# Patient Record
Sex: FEMALE | Race: WHITE | NOT HISPANIC OR LATINO | Employment: OTHER | URBAN - METROPOLITAN AREA
[De-identification: names, ages, dates, MRNs, and addresses within clinical notes are randomized per-mention and may not be internally consistent; named-entity substitution may affect disease eponyms.]

---

## 2017-03-22 ENCOUNTER — RECORDS - HEALTHEAST (OUTPATIENT)
Dept: LAB | Facility: CLINIC | Age: 67
End: 2017-03-22

## 2017-03-22 LAB
CHOLEST SERPL-MCNC: 168 MG/DL
FASTING STATUS PATIENT QL REPORTED: NORMAL
HDLC SERPL-MCNC: 58 MG/DL
LDLC SERPL CALC-MCNC: 90 MG/DL
TRIGL SERPL-MCNC: 101 MG/DL

## 2018-02-22 ENCOUNTER — RECORDS - HEALTHEAST (OUTPATIENT)
Dept: LAB | Facility: CLINIC | Age: 68
End: 2018-02-22

## 2018-02-22 LAB
ALBUMIN SERPL-MCNC: 3.7 G/DL (ref 3.5–5)
ALP SERPL-CCNC: 60 U/L (ref 45–120)
ALT SERPL W P-5'-P-CCNC: 13 U/L (ref 0–45)
ANION GAP SERPL CALCULATED.3IONS-SCNC: 7 MMOL/L (ref 5–18)
AST SERPL W P-5'-P-CCNC: 19 U/L (ref 0–40)
BILIRUB SERPL-MCNC: 0.4 MG/DL (ref 0–1)
BUN SERPL-MCNC: 12 MG/DL (ref 8–22)
CALCIUM SERPL-MCNC: 9.2 MG/DL (ref 8.5–10.5)
CHLORIDE BLD-SCNC: 104 MMOL/L (ref 98–107)
CHOLEST SERPL-MCNC: 176 MG/DL
CO2 SERPL-SCNC: 26 MMOL/L (ref 22–31)
CREAT SERPL-MCNC: 0.86 MG/DL (ref 0.6–1.1)
FASTING STATUS PATIENT QL REPORTED: NO
GFR SERPL CREATININE-BSD FRML MDRD: >60 ML/MIN/1.73M2
GLUCOSE BLD-MCNC: 152 MG/DL (ref 70–125)
HDLC SERPL-MCNC: 66 MG/DL
LDLC SERPL CALC-MCNC: 90 MG/DL
POTASSIUM BLD-SCNC: 4.2 MMOL/L (ref 3.5–5)
PROT SERPL-MCNC: 6.6 G/DL (ref 6–8)
SODIUM SERPL-SCNC: 137 MMOL/L (ref 136–145)
TRIGL SERPL-MCNC: 98 MG/DL

## 2018-05-03 ENCOUNTER — RECORDS - HEALTHEAST (OUTPATIENT)
Dept: LAB | Facility: CLINIC | Age: 68
End: 2018-05-03

## 2018-05-03 LAB
ERYTHROCYTE [SEDIMENTATION RATE] IN BLOOD BY WESTERGREN METHOD: 10 MM/HR (ref 0–20)
RHEUMATOID FACT SERPL-ACNC: <15 IU/ML (ref 0–30)
URATE SERPL-MCNC: 5 MG/DL (ref 2–7.5)

## 2018-05-04 LAB — LYME TOTAL ANTIBODY - HISTORICAL: 0.05 INDEX VALUE

## 2018-06-21 ENCOUNTER — RECORDS - HEALTHEAST (OUTPATIENT)
Dept: LAB | Facility: CLINIC | Age: 68
End: 2018-06-21

## 2018-06-21 LAB
ALBUMIN SERPL-MCNC: 3.7 G/DL (ref 3.5–5)
ALP SERPL-CCNC: 54 U/L (ref 45–120)
ALT SERPL W P-5'-P-CCNC: <9 U/L (ref 0–45)
ANION GAP SERPL CALCULATED.3IONS-SCNC: 10 MMOL/L (ref 5–18)
AST SERPL W P-5'-P-CCNC: 16 U/L (ref 0–40)
BILIRUB SERPL-MCNC: 0.4 MG/DL (ref 0–1)
BUN SERPL-MCNC: 12 MG/DL (ref 8–22)
CALCIUM SERPL-MCNC: 9.4 MG/DL (ref 8.5–10.5)
CHLORIDE BLD-SCNC: 104 MMOL/L (ref 98–107)
CO2 SERPL-SCNC: 23 MMOL/L (ref 22–31)
CREAT SERPL-MCNC: 0.85 MG/DL (ref 0.6–1.1)
GFR SERPL CREATININE-BSD FRML MDRD: >60 ML/MIN/1.73M2
GLUCOSE BLD-MCNC: 162 MG/DL (ref 70–125)
POTASSIUM BLD-SCNC: 4.5 MMOL/L (ref 3.5–5)
PROT SERPL-MCNC: 6.6 G/DL (ref 6–8)
SODIUM SERPL-SCNC: 137 MMOL/L (ref 136–145)

## 2018-10-25 ENCOUNTER — RECORDS - HEALTHEAST (OUTPATIENT)
Dept: LAB | Facility: CLINIC | Age: 68
End: 2018-10-25

## 2018-10-25 LAB
ALBUMIN SERPL-MCNC: 3.6 G/DL (ref 3.5–5)
ALP SERPL-CCNC: 57 U/L (ref 45–120)
ALT SERPL W P-5'-P-CCNC: 10 U/L (ref 0–45)
ANION GAP SERPL CALCULATED.3IONS-SCNC: 10 MMOL/L (ref 5–18)
AST SERPL W P-5'-P-CCNC: 17 U/L (ref 0–40)
BILIRUB SERPL-MCNC: 0.4 MG/DL (ref 0–1)
BUN SERPL-MCNC: 15 MG/DL (ref 8–28)
CALCIUM SERPL-MCNC: 9.6 MG/DL (ref 8.5–10.5)
CHLORIDE BLD-SCNC: 103 MMOL/L (ref 98–107)
CHOLEST SERPL-MCNC: 170 MG/DL
CO2 SERPL-SCNC: 24 MMOL/L (ref 22–31)
CREAT SERPL-MCNC: 0.8 MG/DL (ref 0.6–1.1)
FASTING STATUS PATIENT QL REPORTED: NORMAL
GFR SERPL CREATININE-BSD FRML MDRD: >60 ML/MIN/1.73M2
GLUCOSE BLD-MCNC: 100 MG/DL (ref 70–125)
HDLC SERPL-MCNC: 57 MG/DL
LDLC SERPL CALC-MCNC: 89 MG/DL
POTASSIUM BLD-SCNC: 4.6 MMOL/L (ref 3.5–5)
PROT SERPL-MCNC: 6.5 G/DL (ref 6–8)
SODIUM SERPL-SCNC: 137 MMOL/L (ref 136–145)
TRIGL SERPL-MCNC: 122 MG/DL

## 2018-10-26 LAB — HBV SURFACE AG SERPL QL IA: NEGATIVE

## 2018-11-08 ENCOUNTER — RECORDS - HEALTHEAST (OUTPATIENT)
Dept: LAB | Facility: CLINIC | Age: 68
End: 2018-11-08

## 2018-11-08 LAB
C REACTIVE PROTEIN LHE: 1.1 MG/DL (ref 0–0.8)
ERYTHROCYTE [SEDIMENTATION RATE] IN BLOOD BY WESTERGREN METHOD: 12 MM/HR (ref 0–20)

## 2018-12-18 ENCOUNTER — RECORDS - HEALTHEAST (OUTPATIENT)
Dept: LAB | Facility: CLINIC | Age: 68
End: 2018-12-18

## 2018-12-18 LAB
ALBUMIN SERPL-MCNC: 3.3 G/DL (ref 3.5–5)
ALP SERPL-CCNC: 52 U/L (ref 45–120)
ALT SERPL W P-5'-P-CCNC: 11 U/L (ref 0–45)
ANION GAP SERPL CALCULATED.3IONS-SCNC: 9 MMOL/L (ref 5–18)
AST SERPL W P-5'-P-CCNC: 15 U/L (ref 0–40)
BILIRUB SERPL-MCNC: 0.4 MG/DL (ref 0–1)
BUN SERPL-MCNC: 13 MG/DL (ref 8–28)
CALCIUM SERPL-MCNC: 9 MG/DL (ref 8.5–10.5)
CHLORIDE BLD-SCNC: 104 MMOL/L (ref 98–107)
CO2 SERPL-SCNC: 25 MMOL/L (ref 22–31)
CREAT SERPL-MCNC: 0.9 MG/DL (ref 0.6–1.1)
GFR SERPL CREATININE-BSD FRML MDRD: >60 ML/MIN/1.73M2
GLUCOSE BLD-MCNC: 267 MG/DL (ref 70–125)
POTASSIUM BLD-SCNC: 4.4 MMOL/L (ref 3.5–5)
PROT SERPL-MCNC: 5.8 G/DL (ref 6–8)
SODIUM SERPL-SCNC: 138 MMOL/L (ref 136–145)

## 2019-01-18 ENCOUNTER — RECORDS - HEALTHEAST (OUTPATIENT)
Dept: LAB | Facility: CLINIC | Age: 69
End: 2019-01-18

## 2019-01-18 LAB
ALBUMIN SERPL-MCNC: 3.4 G/DL (ref 3.5–5)
ALP SERPL-CCNC: 48 U/L (ref 45–120)
ALT SERPL W P-5'-P-CCNC: 10 U/L (ref 0–45)
AST SERPL W P-5'-P-CCNC: 14 U/L (ref 0–40)
BILIRUB DIRECT SERPL-MCNC: 0.1 MG/DL
BILIRUB SERPL-MCNC: 0.4 MG/DL (ref 0–1)
PROT SERPL-MCNC: 6.4 G/DL (ref 6–8)

## 2019-02-15 ENCOUNTER — RECORDS - HEALTHEAST (OUTPATIENT)
Dept: LAB | Facility: CLINIC | Age: 69
End: 2019-02-15

## 2019-02-15 LAB
ALBUMIN SERPL-MCNC: 3.6 G/DL (ref 3.5–5)
ALP SERPL-CCNC: 55 U/L (ref 45–120)
ALT SERPL W P-5'-P-CCNC: 13 U/L (ref 0–45)
AST SERPL W P-5'-P-CCNC: 17 U/L (ref 0–40)
BILIRUB DIRECT SERPL-MCNC: 0.1 MG/DL
BILIRUB SERPL-MCNC: 0.3 MG/DL (ref 0–1)
PROT SERPL-MCNC: 6.5 G/DL (ref 6–8)

## 2019-03-15 ENCOUNTER — RECORDS - HEALTHEAST (OUTPATIENT)
Dept: LAB | Facility: CLINIC | Age: 69
End: 2019-03-15

## 2019-03-15 LAB
ALBUMIN SERPL-MCNC: 3.4 G/DL (ref 3.5–5)
ALP SERPL-CCNC: 50 U/L (ref 45–120)
ALT SERPL W P-5'-P-CCNC: 15 U/L (ref 0–45)
AST SERPL W P-5'-P-CCNC: 17 U/L (ref 0–40)
BILIRUB DIRECT SERPL-MCNC: 0.2 MG/DL
BILIRUB SERPL-MCNC: 0.3 MG/DL (ref 0–1)
PROT SERPL-MCNC: 6.2 G/DL (ref 6–8)

## 2019-04-05 ENCOUNTER — RECORDS - HEALTHEAST (OUTPATIENT)
Dept: ADMINISTRATIVE | Facility: OTHER | Age: 69
End: 2019-04-05

## 2019-04-12 ENCOUNTER — RECORDS - HEALTHEAST (OUTPATIENT)
Dept: LAB | Facility: CLINIC | Age: 69
End: 2019-04-12

## 2019-04-12 LAB
ALBUMIN SERPL-MCNC: 3.6 G/DL (ref 3.5–5)
ALP SERPL-CCNC: 54 U/L (ref 45–120)
ALT SERPL W P-5'-P-CCNC: <9 U/L (ref 0–45)
AST SERPL W P-5'-P-CCNC: 12 U/L (ref 0–40)
BILIRUB DIRECT SERPL-MCNC: 0.1 MG/DL
BILIRUB SERPL-MCNC: 0.3 MG/DL (ref 0–1)
PROT SERPL-MCNC: 6.5 G/DL (ref 6–8)

## 2019-04-30 ENCOUNTER — OFFICE VISIT - HEALTHEAST (OUTPATIENT)
Dept: RHEUMATOLOGY | Facility: CLINIC | Age: 69
End: 2019-04-30

## 2019-04-30 ENCOUNTER — COMMUNICATION - HEALTHEAST (OUTPATIENT)
Dept: LAB | Facility: CLINIC | Age: 69
End: 2019-04-30

## 2019-04-30 DIAGNOSIS — M06.4 INFLAMMATORY POLYARTHRITIS (H): ICD-10-CM

## 2019-04-30 DIAGNOSIS — M19.042 LOCALIZED PRIMARY OSTEOARTHRITIS OF BOTH HANDS: ICD-10-CM

## 2019-04-30 DIAGNOSIS — M25.50 POLYARTHRALGIA: ICD-10-CM

## 2019-04-30 DIAGNOSIS — M19.041 LOCALIZED PRIMARY OSTEOARTHRITIS OF BOTH HANDS: ICD-10-CM

## 2019-04-30 ASSESSMENT — MIFFLIN-ST. JEOR: SCORE: 1090.58

## 2019-05-24 ENCOUNTER — RECORDS - HEALTHEAST (OUTPATIENT)
Dept: LAB | Facility: CLINIC | Age: 69
End: 2019-05-24

## 2019-05-24 LAB
ALBUMIN SERPL-MCNC: 3.7 G/DL (ref 3.5–5)
ALP SERPL-CCNC: 50 U/L (ref 45–120)
ALT SERPL W P-5'-P-CCNC: <9 U/L (ref 0–45)
AST SERPL W P-5'-P-CCNC: 15 U/L (ref 0–40)
BILIRUB DIRECT SERPL-MCNC: 0.2 MG/DL
BILIRUB SERPL-MCNC: 0.4 MG/DL (ref 0–1)
CHOLEST SERPL-MCNC: 160 MG/DL
FASTING STATUS PATIENT QL REPORTED: NORMAL
HDLC SERPL-MCNC: 61 MG/DL
LDLC SERPL CALC-MCNC: 78 MG/DL
PROT SERPL-MCNC: 6.6 G/DL (ref 6–8)
TRIGL SERPL-MCNC: 103 MG/DL

## 2019-06-11 ENCOUNTER — AMBULATORY - HEALTHEAST (OUTPATIENT)
Dept: LAB | Facility: CLINIC | Age: 69
End: 2019-06-11

## 2019-06-11 DIAGNOSIS — M06.4 INFLAMMATORY POLYARTHRITIS (H): ICD-10-CM

## 2019-06-11 DIAGNOSIS — M25.50 POLYARTHRALGIA: ICD-10-CM

## 2019-06-11 LAB
ALBUMIN SERPL-MCNC: 3.6 G/DL (ref 3.5–5)
ALT SERPL W P-5'-P-CCNC: 9 U/L (ref 0–45)
BASOPHILS # BLD AUTO: 0 THOU/UL (ref 0–0.2)
BASOPHILS NFR BLD AUTO: 0 % (ref 0–2)
CCP AB SER IA-ACNC: 1.8 U/ML
CREAT SERPL-MCNC: 0.83 MG/DL (ref 0.6–1.1)
EOSINOPHIL # BLD AUTO: 0.2 THOU/UL (ref 0–0.4)
EOSINOPHIL NFR BLD AUTO: 4 % (ref 0–6)
ERYTHROCYTE [DISTWIDTH] IN BLOOD BY AUTOMATED COUNT: 12 % (ref 11–14.5)
GFR SERPL CREATININE-BSD FRML MDRD: >60 ML/MIN/1.73M2
HCT VFR BLD AUTO: 37.9 % (ref 35–47)
HGB BLD-MCNC: 12.7 G/DL (ref 12–16)
LYMPHOCYTES # BLD AUTO: 0.9 THOU/UL (ref 0.8–4.4)
LYMPHOCYTES NFR BLD AUTO: 16 % (ref 20–40)
MCH RBC QN AUTO: 32.2 PG (ref 27–34)
MCHC RBC AUTO-ENTMCNC: 33.5 G/DL (ref 32–36)
MCV RBC AUTO: 96 FL (ref 80–100)
MONOCYTES # BLD AUTO: 0.5 THOU/UL (ref 0–0.9)
MONOCYTES NFR BLD AUTO: 8 % (ref 2–10)
NEUTROPHILS # BLD AUTO: 4.1 THOU/UL (ref 2–7.7)
NEUTROPHILS NFR BLD AUTO: 71 % (ref 50–70)
PLATELET # BLD AUTO: 296 THOU/UL (ref 140–440)
PMV BLD AUTO: 7.9 FL (ref 7–10)
RBC # BLD AUTO: 3.95 MILL/UL (ref 3.8–5.4)
RHEUMATOID FACT SERPL-ACNC: <15 IU/ML (ref 0–30)
URATE SERPL-MCNC: 5 MG/DL (ref 2–7.5)
WBC: 5.7 THOU/UL (ref 4–11)

## 2019-06-12 LAB — HCV AB SERPL QL IA: NEGATIVE

## 2019-06-13 LAB — ANA SER QL: 4.4 U

## 2019-06-18 LAB
DNA (DS) ANTIBODY - HISTORICAL: 2 IU
JO-1 AUTOANTIBODIES - HISTORICAL: 0 EU
SCL-70 AUTOANTIBODIES - HISTORICAL: 0 EU
SM (SMITH AUTOANTIBODIES - HISTORICAL: 1 EU
SM/RNP AUTOANTIBODIES - HISTORICAL: 2 EU
SS-A/RO AUTOANTIBODIES - HISTORICAL: 1 EU
SS-B/LA AUTOANTIBODIES - HISTORICAL: 0 EU

## 2019-07-19 ENCOUNTER — COMMUNICATION - HEALTHEAST (OUTPATIENT)
Dept: LAB | Facility: CLINIC | Age: 69
End: 2019-07-19

## 2019-07-19 DIAGNOSIS — M06.4 INFLAMMATORY POLYARTHRITIS (H): ICD-10-CM

## 2019-07-29 ENCOUNTER — AMBULATORY - HEALTHEAST (OUTPATIENT)
Dept: LAB | Facility: CLINIC | Age: 69
End: 2019-07-29

## 2019-07-29 DIAGNOSIS — M06.4 INFLAMMATORY POLYARTHRITIS (H): ICD-10-CM

## 2019-07-29 LAB
ALBUMIN SERPL-MCNC: 3.5 G/DL (ref 3.5–5)
ALT SERPL W P-5'-P-CCNC: 10 U/L (ref 0–45)
CREAT SERPL-MCNC: 0.79 MG/DL (ref 0.6–1.1)
ERYTHROCYTE [DISTWIDTH] IN BLOOD BY AUTOMATED COUNT: 11.5 % (ref 11–14.5)
GFR SERPL CREATININE-BSD FRML MDRD: >60 ML/MIN/1.73M2
HCT VFR BLD AUTO: 34.5 % (ref 35–47)
HGB BLD-MCNC: 11.6 G/DL (ref 12–16)
MCH RBC QN AUTO: 32.1 PG (ref 27–34)
MCHC RBC AUTO-ENTMCNC: 33.7 G/DL (ref 32–36)
MCV RBC AUTO: 95 FL (ref 80–100)
PLATELET # BLD AUTO: 321 THOU/UL (ref 140–440)
PMV BLD AUTO: 7.8 FL (ref 7–10)
RBC # BLD AUTO: 3.62 MILL/UL (ref 3.8–5.4)
WBC: 7 THOU/UL (ref 4–11)

## 2019-08-01 ENCOUNTER — OFFICE VISIT - HEALTHEAST (OUTPATIENT)
Dept: RHEUMATOLOGY | Facility: CLINIC | Age: 69
End: 2019-08-01

## 2019-08-01 DIAGNOSIS — M19.042 LOCALIZED PRIMARY OSTEOARTHRITIS OF BOTH HANDS: ICD-10-CM

## 2019-08-01 DIAGNOSIS — M19.041 LOCALIZED PRIMARY OSTEOARTHRITIS OF BOTH HANDS: ICD-10-CM

## 2019-08-01 DIAGNOSIS — L40.50 PSA (PSORIATIC ARTHRITIS) (H): ICD-10-CM

## 2019-08-01 DIAGNOSIS — L40.9 PSORIASIS: ICD-10-CM

## 2019-11-04 ENCOUNTER — AMBULATORY - HEALTHEAST (OUTPATIENT)
Dept: LAB | Facility: CLINIC | Age: 69
End: 2019-11-04

## 2019-11-04 DIAGNOSIS — M06.4 INFLAMMATORY POLYARTHRITIS (H): ICD-10-CM

## 2019-11-04 LAB
ALBUMIN SERPL-MCNC: 3.7 G/DL (ref 3.5–5)
ALT SERPL W P-5'-P-CCNC: 21 U/L (ref 0–45)
CREAT SERPL-MCNC: 0.81 MG/DL (ref 0.6–1.1)
ERYTHROCYTE [DISTWIDTH] IN BLOOD BY AUTOMATED COUNT: 12.6 % (ref 11–14.5)
GFR SERPL CREATININE-BSD FRML MDRD: >60 ML/MIN/1.73M2
HCT VFR BLD AUTO: 37.3 % (ref 35–47)
HGB BLD-MCNC: 12.2 G/DL (ref 12–16)
MCH RBC QN AUTO: 32.1 PG (ref 27–34)
MCHC RBC AUTO-ENTMCNC: 32.8 G/DL (ref 32–36)
MCV RBC AUTO: 98 FL (ref 80–100)
PLATELET # BLD AUTO: 283 THOU/UL (ref 140–440)
PMV BLD AUTO: 7.6 FL (ref 7–10)
RBC # BLD AUTO: 3.81 MILL/UL (ref 3.8–5.4)
WBC: 5.6 THOU/UL (ref 4–11)

## 2019-11-06 ENCOUNTER — OFFICE VISIT - HEALTHEAST (OUTPATIENT)
Dept: RHEUMATOLOGY | Facility: CLINIC | Age: 69
End: 2019-11-06

## 2019-11-06 DIAGNOSIS — L40.9 PSORIASIS: ICD-10-CM

## 2019-11-06 DIAGNOSIS — M19.042 LOCALIZED PRIMARY OSTEOARTHRITIS OF BOTH HANDS: ICD-10-CM

## 2019-11-06 DIAGNOSIS — M19.041 LOCALIZED PRIMARY OSTEOARTHRITIS OF BOTH HANDS: ICD-10-CM

## 2019-11-06 DIAGNOSIS — L40.50 PSA (PSORIATIC ARTHRITIS) (H): ICD-10-CM

## 2019-11-06 ASSESSMENT — MIFFLIN-ST. JEOR: SCORE: 1105.09

## 2020-01-13 ENCOUNTER — RECORDS - HEALTHEAST (OUTPATIENT)
Dept: LAB | Facility: CLINIC | Age: 70
End: 2020-01-13

## 2020-01-13 LAB
ALBUMIN SERPL-MCNC: 3.7 G/DL (ref 3.5–5)
ALP SERPL-CCNC: 52 U/L (ref 45–120)
ALT SERPL W P-5'-P-CCNC: 14 U/L (ref 0–45)
ANION GAP SERPL CALCULATED.3IONS-SCNC: 8 MMOL/L (ref 5–18)
AST SERPL W P-5'-P-CCNC: 16 U/L (ref 0–40)
BILIRUB SERPL-MCNC: 0.5 MG/DL (ref 0–1)
BUN SERPL-MCNC: 11 MG/DL (ref 8–28)
CALCIUM SERPL-MCNC: 9.4 MG/DL (ref 8.5–10.5)
CHLORIDE BLD-SCNC: 104 MMOL/L (ref 98–107)
CHOLEST SERPL-MCNC: 172 MG/DL
CO2 SERPL-SCNC: 28 MMOL/L (ref 22–31)
CREAT SERPL-MCNC: 0.81 MG/DL (ref 0.6–1.1)
FASTING STATUS PATIENT QL REPORTED: NORMAL
GFR SERPL CREATININE-BSD FRML MDRD: >60 ML/MIN/1.73M2
GLUCOSE BLD-MCNC: 114 MG/DL (ref 70–125)
HDLC SERPL-MCNC: 53 MG/DL
LDLC SERPL CALC-MCNC: 97 MG/DL
POTASSIUM BLD-SCNC: 4 MMOL/L (ref 3.5–5)
PROT SERPL-MCNC: 6.4 G/DL (ref 6–8)
SODIUM SERPL-SCNC: 140 MMOL/L (ref 136–145)
TRIGL SERPL-MCNC: 112 MG/DL

## 2020-02-03 ENCOUNTER — AMBULATORY - HEALTHEAST (OUTPATIENT)
Dept: LAB | Facility: CLINIC | Age: 70
End: 2020-02-03

## 2020-02-03 DIAGNOSIS — M06.4 INFLAMMATORY POLYARTHRITIS (H): ICD-10-CM

## 2020-02-03 LAB
ALBUMIN SERPL-MCNC: 3.5 G/DL (ref 3.5–5)
ALT SERPL W P-5'-P-CCNC: 13 U/L (ref 0–45)
CREAT SERPL-MCNC: 0.83 MG/DL (ref 0.6–1.1)
ERYTHROCYTE [DISTWIDTH] IN BLOOD BY AUTOMATED COUNT: 10.9 % (ref 11–14.5)
GFR SERPL CREATININE-BSD FRML MDRD: >60 ML/MIN/1.73M2
HCT VFR BLD AUTO: 34.5 % (ref 35–47)
HGB BLD-MCNC: 11.8 G/DL (ref 12–16)
MCH RBC QN AUTO: 33.5 PG (ref 27–34)
MCHC RBC AUTO-ENTMCNC: 34.2 G/DL (ref 32–36)
MCV RBC AUTO: 98 FL (ref 80–100)
PLATELET # BLD AUTO: 252 THOU/UL (ref 140–440)
PMV BLD AUTO: 7.7 FL (ref 7–10)
RBC # BLD AUTO: 3.53 MILL/UL (ref 3.8–5.4)
WBC: 5.2 THOU/UL (ref 4–11)

## 2020-02-06 ENCOUNTER — OFFICE VISIT - HEALTHEAST (OUTPATIENT)
Dept: RHEUMATOLOGY | Facility: CLINIC | Age: 70
End: 2020-02-06

## 2020-02-06 DIAGNOSIS — L40.9 PSORIASIS: ICD-10-CM

## 2020-02-06 DIAGNOSIS — M19.042 LOCALIZED PRIMARY OSTEOARTHRITIS OF BOTH HANDS: ICD-10-CM

## 2020-02-06 DIAGNOSIS — L40.50 PSA (PSORIATIC ARTHRITIS) (H): ICD-10-CM

## 2020-02-06 DIAGNOSIS — M19.041 LOCALIZED PRIMARY OSTEOARTHRITIS OF BOTH HANDS: ICD-10-CM

## 2020-05-05 ENCOUNTER — AMBULATORY - HEALTHEAST (OUTPATIENT)
Dept: LAB | Facility: CLINIC | Age: 70
End: 2020-05-05

## 2020-05-05 DIAGNOSIS — M06.4 INFLAMMATORY POLYARTHRITIS (H): ICD-10-CM

## 2020-05-05 LAB
ALBUMIN SERPL-MCNC: 3.5 G/DL (ref 3.5–5)
ALT SERPL W P-5'-P-CCNC: 13 U/L (ref 0–45)
CREAT SERPL-MCNC: 0.87 MG/DL (ref 0.6–1.1)
ERYTHROCYTE [DISTWIDTH] IN BLOOD BY AUTOMATED COUNT: 11.4 % (ref 11–14.5)
GFR SERPL CREATININE-BSD FRML MDRD: >60 ML/MIN/1.73M2
HCT VFR BLD AUTO: 38.6 % (ref 35–47)
HGB BLD-MCNC: 12.8 G/DL (ref 12–16)
MCH RBC QN AUTO: 32.8 PG (ref 27–34)
MCHC RBC AUTO-ENTMCNC: 33.1 G/DL (ref 32–36)
MCV RBC AUTO: 99 FL (ref 80–100)
PLATELET # BLD AUTO: 281 THOU/UL (ref 140–440)
PMV BLD AUTO: 8.7 FL (ref 7–10)
RBC # BLD AUTO: 3.9 MILL/UL (ref 3.8–5.4)
WBC: 6.1 THOU/UL (ref 4–11)

## 2020-07-30 ENCOUNTER — COMMUNICATION - HEALTHEAST (OUTPATIENT)
Dept: LAB | Facility: CLINIC | Age: 70
End: 2020-07-30

## 2020-07-30 DIAGNOSIS — L40.50 PSA (PSORIATIC ARTHRITIS) (H): ICD-10-CM

## 2020-08-07 ENCOUNTER — AMBULATORY - HEALTHEAST (OUTPATIENT)
Dept: LAB | Facility: CLINIC | Age: 70
End: 2020-08-07

## 2020-08-07 DIAGNOSIS — L40.50 PSA (PSORIATIC ARTHRITIS) (H): ICD-10-CM

## 2020-08-07 LAB
ALBUMIN SERPL-MCNC: 3.9 G/DL (ref 3.5–5)
ALT SERPL W P-5'-P-CCNC: 12 U/L (ref 0–45)
CREAT SERPL-MCNC: 0.85 MG/DL (ref 0.6–1.1)
ERYTHROCYTE [DISTWIDTH] IN BLOOD BY AUTOMATED COUNT: 11.4 % (ref 11–14.5)
GFR SERPL CREATININE-BSD FRML MDRD: >60 ML/MIN/1.73M2
HCT VFR BLD AUTO: 37.7 % (ref 35–47)
HGB BLD-MCNC: 12.9 G/DL (ref 12–16)
MCH RBC QN AUTO: 32.8 PG (ref 27–34)
MCHC RBC AUTO-ENTMCNC: 34.3 G/DL (ref 32–36)
MCV RBC AUTO: 96 FL (ref 80–100)
PLATELET # BLD AUTO: 278 THOU/UL (ref 140–440)
PMV BLD AUTO: 7.9 FL (ref 7–10)
RBC # BLD AUTO: 3.95 MILL/UL (ref 3.8–5.4)
WBC: 5.4 THOU/UL (ref 4–11)

## 2020-08-13 ENCOUNTER — OFFICE VISIT - HEALTHEAST (OUTPATIENT)
Dept: RHEUMATOLOGY | Facility: CLINIC | Age: 70
End: 2020-08-13

## 2020-08-13 DIAGNOSIS — L40.9 PSORIASIS: ICD-10-CM

## 2020-08-13 DIAGNOSIS — M19.041 LOCALIZED PRIMARY OSTEOARTHRITIS OF BOTH HANDS: ICD-10-CM

## 2020-08-13 DIAGNOSIS — L40.50 PSA (PSORIATIC ARTHRITIS) (H): ICD-10-CM

## 2020-08-13 DIAGNOSIS — M19.042 LOCALIZED PRIMARY OSTEOARTHRITIS OF BOTH HANDS: ICD-10-CM

## 2020-11-12 ENCOUNTER — OFFICE VISIT - HEALTHEAST (OUTPATIENT)
Dept: RHEUMATOLOGY | Facility: CLINIC | Age: 70
End: 2020-11-12

## 2020-11-12 DIAGNOSIS — Z79.899 HIGH RISK MEDICATION USE: ICD-10-CM

## 2020-11-12 DIAGNOSIS — L40.9 PSORIASIS: ICD-10-CM

## 2020-11-12 DIAGNOSIS — M19.041 LOCALIZED PRIMARY OSTEOARTHRITIS OF BOTH HANDS: ICD-10-CM

## 2020-11-12 DIAGNOSIS — M19.042 LOCALIZED PRIMARY OSTEOARTHRITIS OF BOTH HANDS: ICD-10-CM

## 2020-11-12 DIAGNOSIS — L40.50 PSA (PSORIATIC ARTHRITIS) (H): ICD-10-CM

## 2020-11-12 RX ORDER — FOLIC ACID 1 MG/1
1 TABLET ORAL DAILY
Qty: 90 TABLET | Refills: 0 | Status: SHIPPED
Start: 2020-11-12 | End: 2021-09-13

## 2020-11-13 ENCOUNTER — AMBULATORY - HEALTHEAST (OUTPATIENT)
Dept: LAB | Facility: CLINIC | Age: 70
End: 2020-11-13

## 2020-11-13 DIAGNOSIS — L40.50 PSA (PSORIATIC ARTHRITIS) (H): ICD-10-CM

## 2020-11-13 LAB
ALBUMIN SERPL-MCNC: 3.8 G/DL (ref 3.5–5)
ALT SERPL W P-5'-P-CCNC: 13 U/L (ref 0–45)
CREAT SERPL-MCNC: 0.89 MG/DL (ref 0.6–1.1)
ERYTHROCYTE [DISTWIDTH] IN BLOOD BY AUTOMATED COUNT: 12.1 % (ref 11–14.5)
GFR SERPL CREATININE-BSD FRML MDRD: >60 ML/MIN/1.73M2
HCT VFR BLD AUTO: 39.3 % (ref 35–47)
HGB BLD-MCNC: 13.1 G/DL (ref 12–16)
MCH RBC QN AUTO: 32.4 PG (ref 27–34)
MCHC RBC AUTO-ENTMCNC: 33.2 G/DL (ref 32–36)
MCV RBC AUTO: 97 FL (ref 80–100)
PLATELET # BLD AUTO: 276 THOU/UL (ref 140–440)
PMV BLD AUTO: 8.1 FL (ref 7–10)
RBC # BLD AUTO: 4.04 MILL/UL (ref 3.8–5.4)
WBC: 4.8 THOU/UL (ref 4–11)

## 2021-01-18 ENCOUNTER — COMMUNICATION - HEALTHEAST (OUTPATIENT)
Dept: SCHEDULING | Facility: CLINIC | Age: 71
End: 2021-01-18

## 2021-01-19 ENCOUNTER — HOME CARE/HOSPICE - HEALTHEAST (OUTPATIENT)
Dept: HOME HEALTH SERVICES | Facility: HOME HEALTH | Age: 71
End: 2021-01-19

## 2021-01-19 ENCOUNTER — COMMUNICATION - HEALTHEAST (OUTPATIENT)
Dept: NEUROSURGERY | Facility: CLINIC | Age: 71
End: 2021-01-19

## 2021-01-19 DIAGNOSIS — S32.010A COMPRESSION FRACTURE OF L1 LUMBAR VERTEBRA (H): ICD-10-CM

## 2021-01-22 ENCOUNTER — HOME CARE/HOSPICE - HEALTHEAST (OUTPATIENT)
Dept: HOME HEALTH SERVICES | Facility: HOME HEALTH | Age: 71
End: 2021-01-22

## 2021-02-12 ENCOUNTER — AMBULATORY - HEALTHEAST (OUTPATIENT)
Dept: LAB | Facility: CLINIC | Age: 71
End: 2021-02-12

## 2021-02-12 DIAGNOSIS — L40.50 PSA (PSORIATIC ARTHRITIS) (H): ICD-10-CM

## 2021-02-12 LAB
ALBUMIN SERPL-MCNC: 3.5 G/DL (ref 3.5–5)
ALT SERPL W P-5'-P-CCNC: <9 U/L (ref 0–45)
CREAT SERPL-MCNC: 0.76 MG/DL (ref 0.6–1.1)
ERYTHROCYTE [DISTWIDTH] IN BLOOD BY AUTOMATED COUNT: 13.2 % (ref 11–14.5)
GFR SERPL CREATININE-BSD FRML MDRD: >60 ML/MIN/1.73M2
HCT VFR BLD AUTO: 36.9 % (ref 35–47)
HGB BLD-MCNC: 12 G/DL (ref 12–16)
MCH RBC QN AUTO: 31.7 PG (ref 27–34)
MCHC RBC AUTO-ENTMCNC: 32.5 G/DL (ref 32–36)
MCV RBC AUTO: 98 FL (ref 80–100)
PLATELET # BLD AUTO: 320 THOU/UL (ref 140–440)
PMV BLD AUTO: 9.8 FL (ref 7–10)
RBC # BLD AUTO: 3.78 MILL/UL (ref 3.8–5.4)
WBC: 4.8 THOU/UL (ref 4–11)

## 2021-02-15 ENCOUNTER — OFFICE VISIT - HEALTHEAST (OUTPATIENT)
Dept: RHEUMATOLOGY | Facility: CLINIC | Age: 71
End: 2021-02-15

## 2021-02-15 ENCOUNTER — OFFICE VISIT - HEALTHEAST (OUTPATIENT)
Dept: NEUROSURGERY | Facility: CLINIC | Age: 71
End: 2021-02-15

## 2021-02-15 ENCOUNTER — HOSPITAL ENCOUNTER (OUTPATIENT)
Dept: RADIOLOGY | Facility: HOSPITAL | Age: 71
Discharge: HOME | End: 2021-02-15
Attending: NEUROLOGICAL SURGERY

## 2021-02-15 DIAGNOSIS — M19.041 LOCALIZED PRIMARY OSTEOARTHRITIS OF BOTH HANDS: ICD-10-CM

## 2021-02-15 DIAGNOSIS — Z79.899 HIGH RISK MEDICATION USE: ICD-10-CM

## 2021-02-15 DIAGNOSIS — L40.9 PSORIASIS: ICD-10-CM

## 2021-02-15 DIAGNOSIS — M19.042 LOCALIZED PRIMARY OSTEOARTHRITIS OF BOTH HANDS: ICD-10-CM

## 2021-02-15 DIAGNOSIS — S32.010D COMPRESSION FRACTURE OF L1 VERTEBRA WITH ROUTINE HEALING, SUBSEQUENT ENCOUNTER: ICD-10-CM

## 2021-02-15 DIAGNOSIS — L40.50 PSA (PSORIATIC ARTHRITIS) (H): ICD-10-CM

## 2021-02-15 DIAGNOSIS — S32.010A COMPRESSION FRACTURE OF L1 LUMBAR VERTEBRA (H): ICD-10-CM

## 2021-02-15 ASSESSMENT — MIFFLIN-ST. JEOR: SCORE: 1107.36

## 2021-05-25 ENCOUNTER — RECORDS - HEALTHEAST (OUTPATIENT)
Dept: ADMINISTRATIVE | Facility: CLINIC | Age: 71
End: 2021-05-25

## 2021-05-26 ENCOUNTER — RECORDS - HEALTHEAST (OUTPATIENT)
Dept: ADMINISTRATIVE | Facility: CLINIC | Age: 71
End: 2021-05-26

## 2021-05-27 ENCOUNTER — RECORDS - HEALTHEAST (OUTPATIENT)
Dept: ADMINISTRATIVE | Facility: CLINIC | Age: 71
End: 2021-05-27

## 2021-05-28 NOTE — TELEPHONE ENCOUNTER
"Lab orders were placed in patient chart.  Patient did not come to the lab the day orders were placed.    Lab orders must be cancelled and reordered as \"Future\" with an expected date.  Thank you    "

## 2021-05-28 NOTE — PROGRESS NOTES
ASSESSMENT AND PLAN:  Ceci Garcia 68 y.o. female is seen here on 04/30/19 for evaluation of painful joints, with background of osteoarthritis of the hands, psoriasis, very likely has psoriatic arthritis, a very impressive response to a modest dose of methotrexate both for her skin and joint symptoms.  She is practically back to normal with the current dose of methotrexate at 10 mg/week, folic acid along.  We will continue this regimen.  Check for other arthropathies as noted.  X-rays of the hands taken today, personally reviewed the films, findings: Loss of joint space and some of the IP joints for example ring finger bilaterally worse on the left side, no pencil Morphology, intact metacarpophalangeal joint spaces, cystic changes around the right second MCP..  Diagnoses and all orders for this visit:    Polyarthralgia  -     HM1(CBC and Differential)  -     Creatinine  -     ALT (SGPT)  -     Albumin  -     Rheumatoid Factor Quant  -     CCP Antibodies  -     Hepatitis C Antibody (Anti-HCV)  -     Uric Acid  -     Antinuclear Antibody (TAMIKO) Cascade  -     HM1 (CBC with Diff)  -     XR Hands Bilateral 3 or More VWS; Future; Expected date: 04/30/2019  -     XR Hands Bilateral 3 or More VWS    Localized primary osteoarthritis of both hands  -     XR Hands Bilateral 3 or More VWS; Future; Expected date: 04/30/2019  -     XR Hands Bilateral 3 or More VWS    Inflammatory polyarthritis (H)  -     XR Hands Bilateral 3 or More VWS; Future; Expected date: 04/30/2019  -     XR Hands Bilateral 3 or More VWS      HISTORY OF PRESENTING ILLNESS:  Ceci Garcia, 68 y.o., female is here for evaluation of painful joints, rash affecting her hands.  She reports that her symptoms began in earnest about a year and a half ago.  She would get periodic pain and stiffness in her hands, her rash, peeling of the skin fingertips, her fingertips would crack open bleed and were swollen.  She was seen in dermatology.  She was also seen and  rheumatology elsewhere.  Meanwhile she was started on 1 methotrexate tablet on alternate weeks, increase to once a week, finally she saw Dr. Ng.  At that point her methotrexate was increased to 10 mg/week.  This was about 6 to 8 weeks ago she recalls.  The original methotrexate treatment plan began around Christmas of 2018.  She feels significantly better since increasing the methotrexate to the current dose.  She reports that her hand use is returned.  She has residual pain such as in her left ring and fifth digit in the PIP.  She reports no other joint areas similarly affected or indeed any other joint area discomfort.  She remains very active.  She recalls that over the past 40 years or so she used to get something similar that would happen for a few weeks or even months and then subside off its own not to happen again for several years.  She is not known to have psoriasis ulcerative colitis Crohn's disease.   Further historical information and ADL limitations as noted in the multidimensional health assessment questionnaire attached in the EMR. Rest of the 13 system ROS is negative.     ALLERGIES:Ciprofloxacin and Oxycodone-acetaminophen    PAST MEDICAL/ACTIVE PROBLEMS/MEDICATION/ FAMILY HISTORY/SOCIAL DATA:  The patient has a family history of  Past Medical History:   Diagnosis Date     Diabetes mellitus (H)      Hyperlipidemia      Social History     Tobacco Use   Smoking Status Former Smoker     There is no problem list on file for this patient.    Current Outpatient Medications   Medication Sig Dispense Refill     aspirin 81 MG EC tablet Take 81 mg by mouth.       CALCIUM ORAL Take by mouth.       cholecalciferol, vitamin D3, (VITAMIN D3) 5,000 unit Tab Take 5,000 Units by mouth.       citalopram (CELEXA) 20 MG tablet Take 1-2 tab daily             cyanocobalamin, vitamin B-12, (VITAMIN B-12 ORAL) Take 5,000 mcg by mouth daily.       folic acid (FOLVITE) 1 MG tablet Take 1 mg by mouth.       glipiZIDE  "(GLUCOTROL) 10 MG 24 hr tablet Take 10 mg by mouth daily.              insulin glargine (LANTUS SOLOSTAR) 100 unit/mL (3 mL) pen Inject 18 Units under the skin at bedtime.              LORazepam (ATIVAN) 0.5 MG tablet Take 0.5 mg by mouth as needed.              metFORMIN (GLUCOPHAGE-XR) 500 MG 24 hr tablet 500 mg.       methotrexate 2.5 MG tablet Take 4 tabs once a week on Friday.       simvastatin (ZOCOR) 20 MG tablet Take 20 mg by mouth at bedtime.              acetaminophen (TYLENOL) 325 MG tablet Take 325 mg by mouth every 6 (six) hours as needed for pain.       b complex vitamins capsule Take 1 capsule by mouth.       CONTOUR NEXT STRIPS strips USE AS DIRECTED 2 TIMES A DAY  5     estradiol (ESTRACE) 0.01 % (0.1 mg/gram) vaginal cream Insert 1 g into the vagina.       naproxen sodium (ALEVE) 220 MG tablet Take 220 mg by mouth 2 (two) times a day with meals.       No current facility-administered medications for this visit.        COMPREHENSIVE EXAMINATION:  Vitals:    04/30/19 0828   BP: 110/60   Pulse: 88   Weight: 134 lb 12.8 oz (61.1 kg)   Height: 5' 3\" (1.6 m)     A well appearing alert oriented female. Vital data as noted above. Her eyes without inflammation/scleromalacia. ENTwithout oral mucositis, thrush, nasal deformity, external ear redness, deformity. Her neck is without lymphadenopathy and supple. Lungs normal sounds, no pleural rub. Heart auscultation normal rate, rhythm; no pericardial rub and murmurs. Abdomen soft, non tender, no organomegaly. Skin examined for heliotrope, malar area eruption, lupus pernio, periungual erythema, sclerodactyly, papules, erythema nodosum, purpura, nail pitting, onycholysis, and obvious psoriasis lesion. Neurological examination shows normal alertness, speech, facial symmetry, tone and power in upper and lower extremities. The joint examination is performed for swelling, tenderness, warmth, erythema, and range of motion in the following joints: DIPs, PIPs, MCPs, " wrists, first CMC's, elbows, shoulders, hips, knees, ankles, feet; spine for range of motion and paraspinal muscles for tenderness. The salient  findings are: She has Heberden's, Mehdi's, some of which are tender, especially the PIPs, she has interphalangeal joint hypertrophy at thumbs.  She has carpal tunnel release surgery scar bilaterally, left middle finger trigger release scar.@he does not have evidence of synovitis in any of the palpable joints of the upper extremities or lower extremities.    LAB / IMAGING DATA:  ALT   Date Value Ref Range Status   04/12/2019 <9 0 - 45 U/L Final   03/15/2019 15 0 - 45 U/L Final   02/15/2019 13 0 - 45 U/L Final     Albumin   Date Value Ref Range Status   04/12/2019 3.6 3.5 - 5.0 g/dL Final   03/15/2019 3.4 (L) 3.5 - 5.0 g/dL Final   02/15/2019 3.6 3.5 - 5.0 g/dL Final     Creatinine   Date Value Ref Range Status   12/18/2018 0.90 0.60 - 1.10 mg/dL Final   10/25/2018 0.80 0.60 - 1.10 mg/dL Final   06/21/2018 0.85 0.60 - 1.10 mg/dL Final       No results found for: WBC, HGB, PLT    Lab Results   Component Value Date    RF <15.0 05/03/2018    SEDRATE 12 11/08/2018

## 2021-05-30 ENCOUNTER — RECORDS - HEALTHEAST (OUTPATIENT)
Dept: ADMINISTRATIVE | Facility: CLINIC | Age: 71
End: 2021-05-30

## 2021-05-31 NOTE — PROGRESS NOTES
ASSESSMENT AND PLAN:  Ceci Garcia 68 y.o. female is seen here on 08/01/19 for follow-up of psoriatic arthritis in the background of psoriasis, osteoarthritis doing great with methotrexate.  She is to continue the current regimen.  Her recent labs are reviewed within acceptable range.  Management principles of osteoarthritis reviewed.  Follow-up as needed 3 months with labs prior.    Diagnoses and all orders for this visit:    PSA (psoriatic arthritis) (H)  -     folic acid (FOLVITE) 1 MG tablet; Take 1 tablet (1 mg total) by mouth daily.  Dispense: 90 tablet; Refill: 0  -     methotrexate 2.5 MG tablet; Take 4 tablets (10 mg total) by mouth once a week. Take 4 tabs once a week on Friday.  Dispense: 48 tablet; Refill: 0    Psoriasis    Localized primary osteoarthritis of both hands      HISTORY OF PRESENTING ILLNESS:  Ceci Garcia, 68 y.o., female is here for psoriatic arthritis, osteoarthritis in the background of psoriasis.  She is done so much better with the current dose of methotrexate.  She noted mild discomfort in her left hand, that would be in her PIP of the ring finger other joints were virtually pain-free she can do all her day-to-day activities without difficulty.  She follows up with Dr. Berenice tom for psoriasis which is improved significantly.  She noted no morning stiffness beyond the first few seconds.  .  The original methotrexate treatment plan began around Christmas of 2018.  She feels significantly better since increasing the methotrexate to the current dose.  She reports that her hand use is returned.  She has residual pain such as in her left ring and fifth digit in the PIP.  She reports no other joint areas similarly affected or indeed any other joint area discomfort.  She remains very active.  She recalls that over the past 40 years or so she used to get something similar that would happen for a few weeks or even months and then subside off its own not to happen again for several years.  She  is not known to have psoriasis ulcerative colitis Crohn's disease.   Further historical information and ADL limitations as noted in the multidimensional health assessment questionnaire attached in the EMR.     ALLERGIES:Ciprofloxacin and Oxycodone-acetaminophen    PAST MEDICAL/ACTIVE PROBLEMS/MEDICATION/ FAMILY HISTORY/SOCIAL DATA:  The patient has a family history of  Past Medical History:   Diagnosis Date     Diabetes mellitus (H)      Hyperlipidemia      Social History     Tobacco Use   Smoking Status Former Smoker     Patient Active Problem List   Diagnosis     Localized primary osteoarthritis of both hands     Inflammatory polyarthritis (H)     Current Outpatient Medications   Medication Sig Dispense Refill     acetaminophen (TYLENOL) 325 MG tablet Take 325 mg by mouth every 6 (six) hours as needed for pain.       aspirin 81 MG EC tablet Take 81 mg by mouth.       b complex vitamins capsule Take 1 capsule by mouth.       CALCIUM ORAL Take by mouth.       cholecalciferol, vitamin D3, (VITAMIN D3) 5,000 unit Tab Take 5,000 Units by mouth.       citalopram (CELEXA) 20 MG tablet Take 1-2 tab daily             CONTOUR NEXT STRIPS strips USE AS DIRECTED 2 TIMES A DAY  5     cyanocobalamin, vitamin B-12, (VITAMIN B-12 ORAL) Take 5,000 mcg by mouth daily.       estradiol (ESTRACE) 0.01 % (0.1 mg/gram) vaginal cream Insert 1 g into the vagina.       folic acid (FOLVITE) 1 MG tablet Take 1 mg by mouth.       glipiZIDE (GLUCOTROL) 10 MG 24 hr tablet Take 10 mg by mouth daily.              insulin glargine (LANTUS SOLOSTAR) 100 unit/mL (3 mL) pen Inject 18 Units under the skin at bedtime.              LORazepam (ATIVAN) 0.5 MG tablet Take 0.5 mg by mouth as needed.              metFORMIN (GLUCOPHAGE-XR) 500 MG 24 hr tablet 500 mg.       methotrexate 2.5 MG tablet Take 4 tabs once a week on Friday.       naproxen sodium (ALEVE) 220 MG tablet Take 220 mg by mouth 2 (two) times a day with meals.       simvastatin (ZOCOR) 20  MG tablet Take 20 mg by mouth at bedtime.              No current facility-administered medications for this visit.        DETAILED EXAMINATION  08/01/19  :  Vitals:    08/01/19 1106   BP: 110/70   Patient Site: Right Arm   Patient Position: Sitting   Cuff Size: Adult Regular   Pulse: 84   Weight: 134 lb (60.8 kg)     Alert oriented. Head including the face is examined for malar rash, heliotropes, scarring, lupus pernio. Eyes examined for redness such as in episcleritis/scleritis, periorbital lesions.   Neck/ Face examined for parotid gland swelling, range of motion of neck.  Left upper and lower and right upper and lower extremities examined for tenderness, swelling, warmth of the appendicular joints, range of motion, edema, rash.  Some of the important findings included: she does not have evidence of synovitis in the palpable joints of the upper extremities.  No significant deformities of the digits.   Heberden nodes.  Range of motion of the shoulders show full abduction.  No JLT effusion or warmth of the knees.   She has carpal tunnel release surgery scar bilaterally,  LAB / IMAGING DATA:  ALT   Date Value Ref Range Status   07/29/2019 10 0 - 45 U/L Final   06/11/2019 9 0 - 45 U/L Final   05/24/2019 <9 0 - 45 U/L Final     Albumin   Date Value Ref Range Status   07/29/2019 3.5 3.5 - 5.0 g/dL Final   06/11/2019 3.6 3.5 - 5.0 g/dL Final   05/24/2019 3.7 3.5 - 5.0 g/dL Final     Creatinine   Date Value Ref Range Status   07/29/2019 0.79 0.60 - 1.10 mg/dL Final   06/11/2019 0.83 0.60 - 1.10 mg/dL Final   12/18/2018 0.90 0.60 - 1.10 mg/dL Final       WBC   Date Value Ref Range Status   07/29/2019 7.0 4.0 - 11.0 thou/uL Final   06/11/2019 5.7 4.0 - 11.0 thou/uL Final     Hemoglobin   Date Value Ref Range Status   07/29/2019 11.6 (L) 12.0 - 16.0 g/dL Final   06/11/2019 12.7 12.0 - 16.0 g/dL Final     Platelets   Date Value Ref Range Status   07/29/2019 321 140 - 440 thou/uL Final   06/11/2019 296 140 - 440 thou/uL Final        Lab Results   Component Value Date    RF <15.0 06/11/2019    SEDRATE 12 11/08/2018

## 2021-06-03 VITALS
HEIGHT: 63 IN | WEIGHT: 138 LBS | HEART RATE: 76 BPM | DIASTOLIC BLOOD PRESSURE: 80 MMHG | SYSTOLIC BLOOD PRESSURE: 126 MMHG | BODY MASS INDEX: 24.45 KG/M2

## 2021-06-03 VITALS — WEIGHT: 134.8 LBS | HEIGHT: 63 IN | BODY MASS INDEX: 23.88 KG/M2

## 2021-06-03 VITALS — WEIGHT: 134 LBS | BODY MASS INDEX: 23.74 KG/M2

## 2021-06-03 NOTE — PROGRESS NOTES
"ASSESSMENT AND PLAN:  Ceci Garcia 69 y.o. female is seen here on 11/06/19 for follow-up. She has psoriatic arthritis, osteoarthritis, psoriasis doing great with methotrexate 10 mg only per week, recent labs normal, most painful joint of the left ring finger PIP where she has the option of corticosteroid injections when she chooses to.  She is to stay the current regimen.  Continue folic acid.  Follow-up here in 3 months labs prior.    Diagnoses and all orders for this visit:    PSA (psoriatic arthritis) (H)  -     methotrexate 2.5 MG tablet; Take 4 tablets (10 mg total) by mouth once a week. Take 4 tabs once a week on Friday.  Dispense: 48 tablet; Refill: 0  -     folic acid (FOLVITE) 1 MG tablet; Take 1 tablet (1 mg total) by mouth daily.  Dispense: 90 tablet; Refill: 0    Psoriasis    Localized primary osteoarthritis of both hands      HISTORY OF PRESENTING ILLNESS:  Ceci Garcia, 69 y.o., female is here for psoriatic arthritis, osteoarthritis in the background of psoriasis.  She is done so much better with the current dose of methotrexate.  She was recently seen in dermatology.  One suggestion that came under consideration was if she should reduce the dose of methotrexate to 7.5 mg / 10 mg on alternate weeks.  She noted mild pain.  This is in the left hand.  This is epicentered at the left ring finger PIP.  No other joint areas similarly affected.  If she bumps into something this hurts more.  She has \"20 seconds\" of stiffness in the morning.  She noted mild discomfort in her left hand, that would be in her PIP of the ring finger other joints were virtually pain-free she can do all her day-to-day activities without difficulty.  She follows up with Dr. Berenice tom for psoriasis which is improved significantly.  She noted no morning stiffness beyond the first few seconds.  .  The original methotrexate treatment plan began around Christmas of 2018.  She feels significantly better since increasing the methotrexate " to the current dose.  She reports that her hand use is returned.  She has residual pain such as in her left ring and fifth digit in the PIP.  She reports no other joint areas similarly affected or indeed any other joint area discomfort.  She remains very active.  She recalls that over the past 40 years or so she used to get something similar that would happen for a few weeks or even months and then subside off its own not to happen again for several years.  She is not known to have psoriasis ulcerative colitis Crohn's disease.   Further historical information and ADL limitations as noted in the multidimensional health assessment questionnaire attached in the EMR.     ALLERGIES:Ciprofloxacin and Oxycodone-acetaminophen    PAST MEDICAL/ACTIVE PROBLEMS/MEDICATION/ FAMILY HISTORY/SOCIAL DATA:  The patient has a family history of  Past Medical History:   Diagnosis Date     Diabetes mellitus (H)      Hyperlipidemia      Social History     Tobacco Use   Smoking Status Former Smoker   Smokeless Tobacco Never Used     Patient Active Problem List   Diagnosis     Localized primary osteoarthritis of both hands     Inflammatory polyarthritis (H)     PSA (psoriatic arthritis) (H)     Psoriasis     Current Outpatient Medications   Medication Sig Dispense Refill     acetaminophen (TYLENOL) 325 MG tablet Take 325 mg by mouth every 6 (six) hours as needed for pain.       aspirin 81 MG EC tablet Take 81 mg by mouth.       b complex vitamins capsule Take 1 capsule by mouth.       CALCIUM ORAL Take by mouth.       cholecalciferol, vitamin D3, (VITAMIN D3) 5,000 unit Tab Take 5,000 Units by mouth.       citalopram (CELEXA) 20 MG tablet Take 1-2 tab daily             CONTOUR NEXT STRIPS strips USE AS DIRECTED 2 TIMES A DAY  5     cyanocobalamin, vitamin B-12, (VITAMIN B-12 ORAL) Take 5,000 mcg by mouth daily.       estradiol (ESTRACE) 0.01 % (0.1 mg/gram) vaginal cream Insert 1 g into the vagina.       glipiZIDE (GLUCOTROL) 10 MG 24 hr  "tablet Take 10 mg by mouth daily.              insulin glargine (LANTUS SOLOSTAR) 100 unit/mL (3 mL) pen Inject 18 Units under the skin at bedtime.              LORazepam (ATIVAN) 0.5 MG tablet Take 0.5 mg by mouth as needed.              metFORMIN (GLUCOPHAGE-XR) 500 MG 24 hr tablet 500 mg.       naproxen sodium (ALEVE) 220 MG tablet Take 220 mg by mouth 2 (two) times a day with meals.       simvastatin (ZOCOR) 20 MG tablet Take 20 mg by mouth at bedtime.              folic acid (FOLVITE) 1 MG tablet Take 1 tablet (1 mg total) by mouth daily. 90 tablet 0     methotrexate 2.5 MG tablet Take 4 tablets (10 mg total) by mouth once a week. Take 4 tabs once a week on Friday. 48 tablet 0     No current facility-administered medications for this visit.        DETAILED EXAMINATION  11/06/19  :  Vitals:    11/06/19 1139   BP: 126/80   Patient Site: Right Arm   Patient Position: Sitting   Cuff Size: Adult Regular   Pulse: 76   Weight: 138 lb (62.6 kg)   Height: 5' 3\" (1.6 m)     Alert oriented. Head including the face is examined for malar rash, heliotropes, scarring, lupus pernio. Eyes examined for redness such as in episcleritis/scleritis, periorbital lesions.   Neck/ Face examined for parotid gland swelling, range of motion of neck.  Left upper and lower and right upper and lower extremities examined for tenderness, swelling, warmth of the appendicular joints, range of motion, edema, rash.  Some of the important findings included: she does not have evidence of synovitis in the palpable joints of the upper extremities.  No significant deformities of the digits.  Left ring finger PIP is tender minimally swollen, Mehdi's.   Heberden nodes.  Range of motion of the shoulders show full abduction.  No JLT effusion or warmth of the knees.   She has carpal tunnel release surgery scar bilaterally, there is no digital dactylitis.  LAB / IMAGING DATA:  ALT   Date Value Ref Range Status   11/04/2019 21 0 - 45 U/L Final   07/29/2019 " 10 0 - 45 U/L Final   06/11/2019 9 0 - 45 U/L Final     Albumin   Date Value Ref Range Status   11/04/2019 3.7 3.5 - 5.0 g/dL Final   07/29/2019 3.5 3.5 - 5.0 g/dL Final   06/11/2019 3.6 3.5 - 5.0 g/dL Final     Creatinine   Date Value Ref Range Status   11/04/2019 0.81 0.60 - 1.10 mg/dL Final   07/29/2019 0.79 0.60 - 1.10 mg/dL Final   06/11/2019 0.83 0.60 - 1.10 mg/dL Final       WBC   Date Value Ref Range Status   11/04/2019 5.6 4.0 - 11.0 thou/uL Final   07/29/2019 7.0 4.0 - 11.0 thou/uL Final     Hemoglobin   Date Value Ref Range Status   11/04/2019 12.2 12.0 - 16.0 g/dL Final   07/29/2019 11.6 (L) 12.0 - 16.0 g/dL Final   06/11/2019 12.7 12.0 - 16.0 g/dL Final     Platelets   Date Value Ref Range Status   11/04/2019 283 140 - 440 thou/uL Final   07/29/2019 321 140 - 440 thou/uL Final   06/11/2019 296 140 - 440 thou/uL Final       Lab Results   Component Value Date    RF <15.0 06/11/2019    SEDRATE 12 11/08/2018

## 2021-06-04 VITALS
HEART RATE: 100 BPM | SYSTOLIC BLOOD PRESSURE: 134 MMHG | WEIGHT: 139 LBS | BODY MASS INDEX: 24.62 KG/M2 | DIASTOLIC BLOOD PRESSURE: 80 MMHG

## 2021-06-05 VITALS
HEIGHT: 62 IN | DIASTOLIC BLOOD PRESSURE: 82 MMHG | BODY MASS INDEX: 26.13 KG/M2 | SYSTOLIC BLOOD PRESSURE: 118 MMHG | WEIGHT: 142 LBS | OXYGEN SATURATION: 97 % | RESPIRATION RATE: 16 BRPM | HEART RATE: 88 BPM

## 2021-06-05 NOTE — PROGRESS NOTES
ASSESSMENT AND PLAN:  Ceci Garcia 69 y.o. female is seen here on 02/06/20 for follow-up of polyarthralgias in association with psoriatic arthritis, osteoarthritis, background of psoriasis, doing so much better with a modest dose of methotrexate at 10 mg/week with folic acid.  Recent labs are within acceptable range.  She feels that with the current regimen she has had substantial improvement and would like to stay the course.  This time will meet here in 6 months with labs every 3 months.       Diagnoses and all orders for this visit:    PSA (psoriatic arthritis) (H)  -     methotrexate 2.5 MG tablet; Take 4 tablets (10 mg total) by mouth once a week. Take 4 tabs once a week on Friday.  Dispense: 48 tablet; Refill: 0  -     folic acid (FOLVITE) 1 MG tablet; Take 1 tablet (1 mg total) by mouth daily.  Dispense: 90 tablet; Refill: 0    Psoriasis    Localized primary osteoarthritis of both hands      HISTORY OF PRESENTING ILLNESS:  Ceci Garcia, 69 y.o., female is here for psoriatic arthritis, osteoarthritis in the background of psoriasis.  She is done so much better with the current dose of methotrexate.  She was recently seen in dermatology.  Not only her joint pains have improved her skin has 2.  She noted pain level to be between 0-0 0.5/10.  Able to do all her day-to-day activities without difficulty.  She noted stiffness in the morning not more than 1 or 2 minutes.  Getting up from her bed walking to her door after the bedroom is all it takes for her to get limbered up.  Her swelling in the fingers have improved.  She has noted psoriasis is improved.  She is tolerated the current dose well.  Recent labs are within acceptable range.  .  She reports no other joint areas similarly affected or indeed any other joint area discomfort.  She remains very active.  She recalls that over the past 40 years or so she used to get something similar that would happen for a few weeks or even months and then subside off its own  not to happen again for several years.  She is not known to have psoriasis ulcerative colitis Crohn's disease.   Further historical information and ADL limitations as noted in the multidimensional health assessment questionnaire attached in the EMR.     ALLERGIES:Ciprofloxacin and Oxycodone-acetaminophen    PAST MEDICAL/ACTIVE PROBLEMS/MEDICATION/ FAMILY HISTORY/SOCIAL DATA:  The patient has a family history of  Past Medical History:   Diagnosis Date     Diabetes mellitus (H)      Hyperlipidemia      Social History     Tobacco Use   Smoking Status Former Smoker   Smokeless Tobacco Never Used     Patient Active Problem List   Diagnosis     Localized primary osteoarthritis of both hands     Inflammatory polyarthritis (H)     PSA (psoriatic arthritis) (H)     Psoriasis     Current Outpatient Medications   Medication Sig Dispense Refill     acetaminophen (TYLENOL) 325 MG tablet Take 325 mg by mouth every 6 (six) hours as needed for pain.       aspirin 81 MG EC tablet Take 81 mg by mouth.       b complex vitamins capsule Take 1 capsule by mouth.       CALCIUM ORAL Take by mouth.       cholecalciferol, vitamin D3, (VITAMIN D3) 5,000 unit Tab Take 5,000 Units by mouth.       citalopram (CELEXA) 20 MG tablet Take 1-2 tab daily             CONTOUR NEXT STRIPS strips USE AS DIRECTED 2 TIMES A DAY  5     cyanocobalamin, vitamin B-12, (VITAMIN B-12 ORAL) Take 5,000 mcg by mouth daily.       estradiol (ESTRACE) 0.01 % (0.1 mg/gram) vaginal cream Insert 1 g into the vagina.       glipiZIDE (GLUCOTROL) 10 MG 24 hr tablet Take 10 mg by mouth daily.              insulin glargine (LANTUS SOLOSTAR) 100 unit/mL (3 mL) pen Inject 18 Units under the skin at bedtime.              LORazepam (ATIVAN) 0.5 MG tablet Take 0.5 mg by mouth as needed.              metFORMIN (GLUCOPHAGE-XR) 500 MG 24 hr tablet 500 mg.       naproxen sodium (ALEVE) 220 MG tablet Take 220 mg by mouth 2 (two) times a day with meals.       simvastatin (ZOCOR) 20 MG  tablet Take 20 mg by mouth at bedtime.              folic acid (FOLVITE) 1 MG tablet Take 1 tablet (1 mg total) by mouth daily. 90 tablet 0     methotrexate 2.5 MG tablet Take 4 tablets (10 mg total) by mouth once a week. Take 4 tabs once a week on Friday. 48 tablet 0     No current facility-administered medications for this visit.        DETAILED EXAMINATION  02/06/20  :  Vitals:    02/06/20 1148   BP: 134/80   Patient Site: Right Arm   Patient Position: Sitting   Cuff Size: Adult Regular   Pulse: 100   Weight: 139 lb (63 kg)     Alert oriented. Head including the face is examined for malar rash, heliotropes, scarring, lupus pernio. Eyes examined for redness such as in episcleritis/scleritis, periorbital lesions.   Neck/ Face examined for parotid gland swelling, range of motion of neck.  Left upper and lower and right upper and lower extremities examined for tenderness, swelling, warmth of the appendicular joints, range of motion, edema, rash.  Some of the important findings included: she does not have synovitis of palpable joints of upper extremities she has marked Heberden's and Mehdi's, reduced tenderness in the PIPs especially the left ring finger.  We talked about even injecting that on her previous visit.  Knees are without joint line tenderness, effusion warmth.   She has carpal tunnel release surgery scar bilaterally, there is no digital dactylitis.  LAB / IMAGING DATA:  ALT   Date Value Ref Range Status   02/03/2020 13 0 - 45 U/L Final   01/13/2020 14 0 - 45 U/L Final   11/04/2019 21 0 - 45 U/L Final     Albumin   Date Value Ref Range Status   02/03/2020 3.5 3.5 - 5.0 g/dL Final   01/13/2020 3.7 3.5 - 5.0 g/dL Final   11/04/2019 3.7 3.5 - 5.0 g/dL Final     Creatinine   Date Value Ref Range Status   02/03/2020 0.83 0.60 - 1.10 mg/dL Final   01/13/2020 0.81 0.60 - 1.10 mg/dL Final   11/04/2019 0.81 0.60 - 1.10 mg/dL Final       WBC   Date Value Ref Range Status   02/03/2020 5.2 4.0 - 11.0 thou/uL Final    11/04/2019 5.6 4.0 - 11.0 thou/uL Final     Hemoglobin   Date Value Ref Range Status   02/03/2020 11.8 (L) 12.0 - 16.0 g/dL Final   11/04/2019 12.2 12.0 - 16.0 g/dL Final   07/29/2019 11.6 (L) 12.0 - 16.0 g/dL Final     Platelets   Date Value Ref Range Status   02/03/2020 252 140 - 440 thou/uL Final   11/04/2019 283 140 - 440 thou/uL Final   07/29/2019 321 140 - 440 thou/uL Final       Lab Results   Component Value Date    RF <15.0 06/11/2019    SEDRATE 12 11/08/2018

## 2021-06-10 NOTE — PROGRESS NOTES
"Ceci Garcia is a 69 y.o. female who is being evaluated via a billable video visit.      The patient has been notified of following:     \"This video visit will be conducted via a call between you and your physician/provider. We have found that certain health care needs can be provided without the need for an in-person physical exam.  This service lets us provide the care you need with a video conversation.  If a prescription is necessary we can send it directly to your pharmacy.  If lab work is needed we can place an order for that and you can then stop by our lab to have the test done at a later time.    Video visits are billed at different rates depending on your insurance coverage. Please reach out to your insurance provider with any questions.    If during the course of the call the physician/provider feels a video visit is not appropriate, you will not be charged for this service.\"    Patient has given verbal consent to a Video visit? Yes  How would you like to obtain your AVS? AVS Preference: [employer].  If dropped by the video visit, the video invitation should be sent to: Text to cell phone: [phone]  Will anyone else be joining your video visit? No          Video-Visit Details    Type of service:  Video Visit      Originating Location (pt. Location): Home    Distant Location (provider location):  [facility]     Platform used for Video Visit: [facility]      ASSESSMENT AND PLAN:    Diagnoses and all orders for this visit:    PSA (psoriatic arthritis) (H)    Psoriasis    Localized primary osteoarthritis of both hands          HISTORY OF PRESENTING ILLNESS:  Ceci Garcia 69 y.o. is evaluated here via video link.  This is for follow-up.  She has psoriatic arthritis, psoriasis, osteoarthritis.  She is doing great.  She is on methotrexate 7.5 mg/week with folic acid.  Yesterday she was seen in dermatology and had noted a pain in the left ring finger PIP and was given to corticosteroid shots into her " PIP area.  She describes no features suggestive of triggering/clicking.  Other than the left ring finger she has been doing overall quite well.  She is able to do most of her day-to-day activities without difficulty.  She had her labs drawn recently which are reviewed with her within acceptable range.  She is to stay the course with methotrexate.  Continue folic acid.  We will meet here in 3 months or sooner.  For her psoriasis she gets in addition light treatment.  ROS enquiry held for fever, ocular symptoms, rash, headache,  GI issues.  Today we also discussed the issues related to the current pandemic, the pros and cons of the current treatment plan, the CDC guidelines such as social distancing washing the hands covering the cough.  ALLERGIES:Ciprofloxacin and Oxycodone-acetaminophen    PAST MEDICAL/ACTIVE PROBLEMS/MEDICATION/SOCIAL DATA  Past Medical History:   Diagnosis Date     Diabetes mellitus (H)      Hyperlipidemia      Social History     Tobacco Use   Smoking Status Former Smoker   Smokeless Tobacco Never Used     Patient Active Problem List   Diagnosis     Localized primary osteoarthritis of both hands     Inflammatory polyarthritis (H)     PSA (psoriatic arthritis) (H)     Psoriasis     Current Outpatient Medications   Medication Sig Dispense Refill     acetaminophen (TYLENOL) 325 MG tablet Take 325 mg by mouth every 6 (six) hours as needed for pain.       aspirin 81 MG EC tablet Take 81 mg by mouth.       b complex vitamins capsule Take 1 capsule by mouth.       CALCIUM ORAL Take by mouth.       cholecalciferol, vitamin D3, (VITAMIN D3) 5,000 unit Tab Take 5,000 Units by mouth.       citalopram (CELEXA) 20 MG tablet Take 1-2 tab daily             CONTOUR NEXT STRIPS strips USE AS DIRECTED 2 TIMES A DAY  5     cyanocobalamin, vitamin B-12, (VITAMIN B-12 ORAL) Take 5,000 mcg by mouth daily.       estradiol (ESTRACE) 0.01 % (0.1 mg/gram) vaginal cream Insert 1 g into the vagina.       glipiZIDE  (GLUCOTROL) 10 MG 24 hr tablet Take 10 mg by mouth daily.              insulin glargine (LANTUS SOLOSTAR) 100 unit/mL (3 mL) pen Inject 18 Units under the skin at bedtime.              LORazepam (ATIVAN) 0.5 MG tablet Take 0.5 mg by mouth as needed.              metFORMIN (GLUCOPHAGE-XR) 500 MG 24 hr tablet 500 mg.       naproxen sodium (ALEVE) 220 MG tablet Take 220 mg by mouth 2 (two) times a day with meals.       simvastatin (ZOCOR) 20 MG tablet Take 20 mg by mouth at bedtime.              folic acid (FOLVITE) 1 MG tablet Take 1 tablet (1 mg total) by mouth daily. 90 tablet 0     methotrexate 2.5 MG tablet Take 4 tablets (10 mg total) by mouth once a week. Take 4 tabs once a week on Friday. (Patient taking differently: Take 7.5 mg by mouth once a week. Take 4 tabs once a week on Friday. ) 48 tablet 0     No current facility-administered medications for this visit.          EXAMINATION:    Using the audio and video link as best as possible the constitutional, neck, neurologic, psych, skin, both upper extremities areas/organ system were evaluated during this assessment.  Some of the important findings: She is comfortable, she is able to fully flex the digits, she has no dactylitis of digits, flexion and abduction of the shoulder is normal.      LAB / IMAGING DATA:  ALT   Date Value Ref Range Status   08/07/2020 12 0 - 45 U/L Final   05/05/2020 13 0 - 45 U/L Final   02/03/2020 13 0 - 45 U/L Final     Albumin   Date Value Ref Range Status   08/07/2020 3.9 3.5 - 5.0 g/dL Final   05/05/2020 3.5 3.5 - 5.0 g/dL Final   02/03/2020 3.5 3.5 - 5.0 g/dL Final     Creatinine   Date Value Ref Range Status   08/07/2020 0.85 0.60 - 1.10 mg/dL Final   05/05/2020 0.87 0.60 - 1.10 mg/dL Final   02/03/2020 0.83 0.60 - 1.10 mg/dL Final       WBC   Date Value Ref Range Status   08/07/2020 5.4 4.0 - 11.0 thou/uL Final   05/05/2020 6.1 4.0 - 11.0 thou/uL Final     Hemoglobin   Date Value Ref Range Status   08/07/2020 12.9 12.0 - 16.0  g/dL Final   05/05/2020 12.8 12.0 - 16.0 g/dL Final   02/03/2020 11.8 (L) 12.0 - 16.0 g/dL Final     Platelets   Date Value Ref Range Status   08/07/2020 278 140 - 440 thou/uL Final   05/05/2020 281 140 - 440 thou/uL Final   02/03/2020 252 140 - 440 thou/uL Final       Lab Results   Component Value Date    RF <15.0 06/11/2019    SEDRATE 12 11/08/2018     Duration of the call:6  Minutes  Call start: 109  pm  Call end:   115pm

## 2021-06-10 NOTE — TELEPHONE ENCOUNTER
Ceci has an upcoming lab appointment.  There is either no standing order or it will  before the appointment.  Please place appropriate orders.    Thanks,     Lab

## 2021-06-11 ENCOUNTER — AMBULATORY - HEALTHEAST (OUTPATIENT)
Dept: LAB | Facility: CLINIC | Age: 71
End: 2021-06-11

## 2021-06-11 DIAGNOSIS — L40.50 PSA (PSORIATIC ARTHRITIS) (H): ICD-10-CM

## 2021-06-11 LAB
ALBUMIN SERPL-MCNC: 3.8 G/DL (ref 3.5–5)
ALT SERPL W P-5'-P-CCNC: 12 U/L (ref 0–45)
CREAT SERPL-MCNC: 0.82 MG/DL (ref 0.6–1.1)
ERYTHROCYTE [DISTWIDTH] IN BLOOD BY AUTOMATED COUNT: 13.5 % (ref 11–14.5)
GFR SERPL CREATININE-BSD FRML MDRD: >60 ML/MIN/1.73M2
HCT VFR BLD AUTO: 37.5 % (ref 35–47)
HGB BLD-MCNC: 12.2 G/DL (ref 12–16)
MCH RBC QN AUTO: 32.5 PG (ref 27–34)
MCHC RBC AUTO-ENTMCNC: 32.5 G/DL (ref 32–36)
MCV RBC AUTO: 100 FL (ref 80–100)
PLATELET # BLD AUTO: 236 THOU/UL (ref 140–440)
PMV BLD AUTO: 10 FL (ref 7–10)
RBC # BLD AUTO: 3.75 MILL/UL (ref 3.8–5.4)
WBC: 4.6 THOU/UL (ref 4–11)

## 2021-06-13 NOTE — PROGRESS NOTES
"Ceci Garcia is a 70 y.o. female who is being evaluated via a billable video visit.      The patient has been notified of following:     \"This video visit will be conducted via a call between you and your physician/provider. We have found that certain health care needs can be provided without the need for an in-person physical exam.  This service lets us provide the care you need with a video conversation.  If a prescription is necessary we can send it directly to your pharmacy.  If lab work is needed we can place an order for that and you can then stop by our lab to have the test done at a later time.    Video visits are billed at different rates depending on your insurance coverage. Please reach out to your insurance provider with any questions.    If during the course of the call the physician/provider feels a video visit is not appropriate, you will not be charged for this service.\"    Patient has given verbal consent to a Video visit? Yes  How would you like to obtain your AVS? AVS Preference: MyChart.  If dropped by the video visit, the video invitation should be sent to: Text to cell phone: [phone]  Will anyone else be joining your video visit? No        Video-Visit Details    Type of service:  Video Visit    Originating Location (pt. Location): Home    Distant Location (provider location):  [facility]     Platform used for Video Visit: [facility]      ASSESSMENT AND PLAN:    Diagnoses and all orders for this visit:    PSA (psoriatic arthritis) (H)  -     methotrexate 2.5 MG tablet; Take 3 tablets (7.5 mg total) by mouth once a week.  Dispense: 36 tablet; Refill: 0  -     folic acid (FOLVITE) 1 MG tablet; Take 1 tablet (1 mg total) by mouth daily.  Dispense: 90 tablet; Refill: 0    Psoriasis    Localized primary osteoarthritis of both hands    High risk medication use          HISTORY OF PRESENTING ILLNESS:  Ceci Garcia 70 y.o. is evaluated here via video link.  This is for " follow-up.  She has psoriatic arthritis, psoriasis, osteoarthritis.  The symptoms have troubled her for the past quite some time.  Affected multiple joints predominantly upper extremities.  She has tolerated methotrexate nicely.  She is due for labs coming in tomorrow.  She takes it with folic acid.  Recently tested for COVID-19 turned out to be negative after a family member had turned positive.  She experienced no symptoms.   She is able to do most of her day-to-day activities without difficulty.   She is to stay the course with methotrexate.  Continue folic acid.  We will meet here in 3 months or sooner.  For her psoriasis she gets in addition light treatment.   ROS enquiry held for fever, ocular symptoms, rash, headache,  GI issues.  Today we also discussed the issues related to the current pandemic, the pros and cons of the current treatment plan, the CDC guidelines such as social distancing washing the hands covering the cough.  ALLERGIES:Ciprofloxacin and Oxycodone-acetaminophen    PAST MEDICAL/ACTIVE PROBLEMS/MEDICATION/SOCIAL DATA  Past Medical History:   Diagnosis Date     Diabetes mellitus (H)      Hyperlipidemia      Social History     Tobacco Use   Smoking Status Former Smoker   Smokeless Tobacco Never Used     Patient Active Problem List   Diagnosis     Localized primary osteoarthritis of both hands     Inflammatory polyarthritis (H)     PSA (psoriatic arthritis) (H)     Psoriasis     Current Outpatient Medications   Medication Sig Dispense Refill     acetaminophen (TYLENOL) 325 MG tablet Take 325 mg by mouth every 6 (six) hours as needed for pain.       aspirin 81 MG EC tablet Take 81 mg by mouth.       b complex vitamins capsule Take 1 capsule by mouth.       CALCIUM ORAL Take by mouth.       cholecalciferol, vitamin D3, (VITAMIN D3) 5,000 unit Tab Take 5,000 Units by mouth.       citalopram (CELEXA) 20 MG tablet Take 1-2 tab daily             CONTOUR NEXT STRIPS strips USE AS DIRECTED 2 TIMES A DAY   5     cyanocobalamin, vitamin B-12, (VITAMIN B-12 ORAL) Take 5,000 mcg by mouth daily.       estradiol (ESTRACE) 0.01 % (0.1 mg/gram) vaginal cream Insert 1 g into the vagina.       glipiZIDE (GLUCOTROL) 10 MG 24 hr tablet Take 10 mg by mouth daily.              insulin glargine (LANTUS SOLOSTAR) 100 unit/mL (3 mL) pen Inject 18 Units under the skin at bedtime.              LORazepam (ATIVAN) 0.5 MG tablet Take 0.5 mg by mouth as needed.              metFORMIN (GLUCOPHAGE-XR) 500 MG 24 hr tablet 500 mg.       naproxen sodium (ALEVE) 220 MG tablet Take 220 mg by mouth 2 (two) times a day with meals.       simvastatin (ZOCOR) 20 MG tablet Take 20 mg by mouth at bedtime.              folic acid (FOLVITE) 1 MG tablet Take 1 tablet (1 mg total) by mouth daily. 90 tablet 0     methotrexate 2.5 MG tablet Take 3 tablets (7.5 mg total) by mouth once a week. 36 tablet 0     No current facility-administered medications for this visit.          EXAMINATION:    Using the audio and video link as best as possible the constitutional, neck, neurologic, psych, skin, both upper extremities areas/organ system were evaluated during this assessment.  Some of the important findings: Alert, oriented, speech fluent.   Able to fully flex the digits, into fists bilaterally, she has some Heberden's, and Mehdi's.  Wrist and elbow elbow range of motion appear normal, abduction of the shoulder is normal.      LAB / IMAGING DATA:  ALT   Date Value Ref Range Status   08/07/2020 12 0 - 45 U/L Final   05/05/2020 13 0 - 45 U/L Final   02/03/2020 13 0 - 45 U/L Final     Albumin   Date Value Ref Range Status   08/07/2020 3.9 3.5 - 5.0 g/dL Final   05/05/2020 3.5 3.5 - 5.0 g/dL Final   02/03/2020 3.5 3.5 - 5.0 g/dL Final     Creatinine   Date Value Ref Range Status   08/07/2020 0.85 0.60 - 1.10 mg/dL Final   05/05/2020 0.87 0.60 - 1.10 mg/dL Final   02/03/2020 0.83 0.60 - 1.10 mg/dL Final       WBC   Date Value Ref Range Status   08/07/2020 5.4 4.0 -  11.0 thou/uL Final   05/05/2020 6.1 4.0 - 11.0 thou/uL Final     Hemoglobin   Date Value Ref Range Status   08/07/2020 12.9 12.0 - 16.0 g/dL Final   05/05/2020 12.8 12.0 - 16.0 g/dL Final   02/03/2020 11.8 (L) 12.0 - 16.0 g/dL Final     Platelets   Date Value Ref Range Status   08/07/2020 278 140 - 440 thou/uL Final   05/05/2020 281 140 - 440 thou/uL Final   02/03/2020 252 140 - 440 thou/uL Final       Lab Results   Component Value Date    RF <15.0 06/11/2019    SEDRATE 12 11/08/2018     Duration of the call:6  Minutes  Call start:  12:05pm  pm  Call end:   1211pm

## 2021-06-14 ENCOUNTER — OFFICE VISIT - HEALTHEAST (OUTPATIENT)
Dept: RHEUMATOLOGY | Facility: CLINIC | Age: 71
End: 2021-06-14

## 2021-06-14 DIAGNOSIS — M19.041 LOCALIZED PRIMARY OSTEOARTHRITIS OF BOTH HANDS: ICD-10-CM

## 2021-06-14 DIAGNOSIS — L40.9 PSORIASIS: ICD-10-CM

## 2021-06-14 DIAGNOSIS — M19.042 LOCALIZED PRIMARY OSTEOARTHRITIS OF BOTH HANDS: ICD-10-CM

## 2021-06-14 DIAGNOSIS — Z79.899 HIGH RISK MEDICATION USE: ICD-10-CM

## 2021-06-14 DIAGNOSIS — L40.50 PSA (PSORIATIC ARTHRITIS) (H): ICD-10-CM

## 2021-06-14 NOTE — TELEPHONE ENCOUNTER
PATIENT NAME:  Ceci Garcia  YOB: 1950  MRN: [id number]  SURGEON: Dr. Agrawal  DATE of CONSULT: 01/18/2021  Consult for L1 compression fracture    FOLLOW-UP PLAN:    Hospital Follow Up Visit: 3-4 weeks  Provider: Dr. Agrawal    DIAGNOSTICS:  XR  DISPOSITION:  Home 01/19/2021    ADDITIONAL INSTRUCTIONS FOR MEDICAL STAFF:    Audra Olson RN, CNRN

## 2021-06-15 NOTE — PROGRESS NOTES
Patient states that she is feeling better than she was last time she was seen. She states that she takes tylenol a few times a day and that helps.   Guillermina Cuellar,ABIMBOLA,2:01 PM

## 2021-06-15 NOTE — PROGRESS NOTES
Ms. Garcia is seen in follow-up of an L1 compression fracture sustained in a fall approximately 5 weeks ago.  She was briefly hospitalized for management of acute onset of upper lumbar pain.  Following discharge she noted continuing improvement in her symptoms and estimates today that she is at least 60% better.  She has some pain which she diagrammatically points to her lower lumbar spine near her lumbosacral junction more prominent toward the left side currently but not associated with difficulty with ambulation, numbness weakness or paresthesias into her lower extremities.  She has resumed most of her activities without substantial difficulty.    On examination, she has no palpable tenderness over midline from the thoracolumbar junction downward.  Her pain primarily appears to be in her flank bilaterally left greater than right and localizing to approximately the L4 or L5 level.  She is able to change position from seated to standing without difficulty and without pain behavior.  Gait appears normal.    She has undergone a plain lumbar radiograph today which shows some interval loss of height at the L1 vertebral body, approximately 20% from the superior endplate.  Spinal alignment otherwise appears satisfactory and there is minimal kyphotic deformity.    Assessment: Symptomatically improving and likely healing L1 compression fracture sustained in a fall just over a month ago.  Given the circumstances, I do not believe that a surgical procedure such as kyphoplasty is a reasonable alternative at this point and I anticipate that the patient will go on to heal this L1 compression fracture without intervention.  I went over the differential diagnosis and management alternatives with her today in the office.  She was also accompanied by her daughter who was present throughout the course the interview and examination.  We have agreed to follow her on an as-needed basis.  I told her that should she experience a  significant return of pain at the thoracolumbar junction which I explained, that she should call or return to the office or emergency room for reevaluation.  I discussed with her reasonable commonsense restrictions in terms of activity and lifting and her questions were answered to her apparent satisfaction.  I have recommended that she follow-up with her primary care provider for a bone density scan and any other therapy which may be necessary based upon that result.      Approximately 25 minutes in total was spent reviewing clinical and radiographic records and discussing management alternatives, risks and benefits with the patient.

## 2021-06-15 NOTE — PROGRESS NOTES
Ceci Garcia is a 70 y.o. female who is being evaluated via a billable video visit.      How would you like to obtain your AVS? Mail a copy.  If dropped from the video visit, the video invitation should be resent by: [phone]  Will anyone else be joining your video visit? No      Video Start Time: 12:40 PM  Video-Visit Details    Type of service:  Video Visit    Video End Time (time video stopped): 12:45 PM  Originating Location (pt. Location): Home    Distant Location (provider location):  [facility]     Platform used for Video Visit: [employer]     This document was created using a software with less than 100% fidelity, at times resulting in unintended, even erroneous syntax and grammar.  The reader is advised to keep this under consideration while reviewing, interpreting this note.           ASSESSMENT AND PLAN:    Diagnoses and all orders for this visit:    PSA (psoriatic arthritis) (H)  -     methotrexate 2.5 MG tablet; Take 2 tablets (5 mg total) by mouth once a week.  Dispense: 24 tablet; Refill: 0    Localized primary osteoarthritis of both hands    High risk medication use    Psoriasis          HISTORY OF PRESENTING ILLNESS:  Ceci Garcia 70 y.o. is evaluated here via video link. This is for follow-up.  She has psoriatic arthritis, psoriasis, osteoarthritis.  She is doing great.  She is on methotrexate 7.5 mg/week with folic acid.  Her only pain is in the lower back.  5 weeks ago when climbing couple of steps she fell fracturing L1.  She has longstanding vertigo dating back to a fall many years ago when she was chasing one of her grandchildren.  She feels of the methotrexate may have added to that sense of vertigo and would like to drop the dose further down to 5 mg/week from the current of 7.5.  We discussed pros and cons.  Her recent labs are within acceptable range.  She is aware that dropping the methotrexate could flareup her joint symptoms.  We will meet here in 3 months  with labs prior.  She is well aware of the management principles of OA.       ROS enquiry held for fever, ocular symptoms, rash, headache,  GI issues.  Today we also discussed the issues related to the current pandemic, the pros and cons of the current treatment plan, the CDC guidelines such as social distancing washing the hands covering the cough.  ALLERGIES:Ciprofloxacin and Oxycodone-acetaminophen    PAST MEDICAL/ACTIVE PROBLEMS/MEDICATION/SOCIAL DATA  Past Medical History:   Diagnosis Date     Diabetes mellitus (H)      Hyperlipidemia      Social History     Tobacco Use   Smoking Status Former Smoker   Smokeless Tobacco Never Used     Patient Active Problem List   Diagnosis     Localized primary osteoarthritis of both hands     PSA (psoriatic arthritis) (H)     Psoriasis     High risk medication use     Closed compression fracture of body of L1 vertebra (H)     Type 2 diabetes mellitus without complication (H)     Hyperlipidemia     Current Outpatient Medications   Medication Sig Dispense Refill     acetaminophen (TYLENOL) 325 MG tablet Take 325 mg by mouth every 6 (six) hours as needed for pain.       aspirin 81 MG EC tablet Take 81 mg by mouth daily.        b complex vitamins capsule Take 1 capsule by mouth daily.        calcitonin, salmon, (MIACALCIN) 200 unit/actuation nasal spray Apply 1 spray into alternating nostrils daily. 3.7 mL 0     CALCIUM ORAL Take 1 tablet by mouth daily.        cholecalciferol, vitamin D3, (VITAMIN D3) 5,000 unit Tab Take 5,000 Units by mouth daily.        CONTOUR NEXT STRIPS strips USE AS DIRECTED 2 TIMES A DAY  5     cyanocobalamin, vitamin B-12, (VITAMIN B-12 ORAL) Take 5,000 mcg by mouth daily.       estradiol (ESTRACE) 0.01 % (0.1 mg/gram) vaginal cream Insert 1 g into the vagina every other day.        glipiZIDE (GLUCOTROL) 10 MG 24 hr tablet Take 10 mg by mouth daily.              HYDROmorphone (DILAUDID) 2 MG tablet Take 0.5-1 tablets (1-2 mg total) by mouth every 4  (four) hours as needed. 12 tablet 0     insulin glargine (LANTUS SOLOSTAR) 100 unit/mL (3 mL) pen Inject 19 Units under the skin at bedtime.        lidocaine (XYLOCAINE) 5 % ointment Apply to posterior neck 2 gms 4 times/day 150 g 0     LORazepam (ATIVAN) 0.5 MG tablet Take 0.5 mg by mouth at bedtime.        metFORMIN (GLUCOPHAGE-XR) 500 MG 24 hr tablet Take 500 mg by mouth daily with breakfast.        metFORMIN (GLUCOPHAGE-XR) 500 MG 24 hr tablet Take 1,000 mg by mouth daily with supper.       simvastatin (ZOCOR) 20 MG tablet Take 20 mg by mouth at bedtime.              folic acid (FOLVITE) 1 MG tablet Take 1 tablet (1 mg total) by mouth daily. (Patient taking differently: Take 1 mg by mouth see administration instructions. Daily except Fridays) 90 tablet 0     methotrexate 2.5 MG tablet Take 3 tablets (7.5 mg total) by mouth once a week. 36 tablet 0     No current facility-administered medications for this visit.          EXAMINATION:    Using the audio and video link as best as possible the constitutional, neck, neurologic, psych, skin, both upper extremities areas/organ system were evaluated during this assessment.  Some of the important findings: Alert, oriented, speech fluent.   Able to fully flex the digits, into fists bilaterally, wrist and  elbow range of motion appear normal, abduction of the shoulder is normal.      LAB / IMAGING DATA:  ALT   Date Value Ref Range Status   02/12/2021 <9 0 - 45 U/L Final   11/13/2020 13 0 - 45 U/L Final   08/07/2020 12 0 - 45 U/L Final     Albumin   Date Value Ref Range Status   02/12/2021 3.5 3.5 - 5.0 g/dL Final   11/13/2020 3.8 3.5 - 5.0 g/dL Final   08/07/2020 3.9 3.5 - 5.0 g/dL Final     Creatinine   Date Value Ref Range Status   02/12/2021 0.76 0.60 - 1.10 mg/dL Final   11/13/2020 0.89 0.60 - 1.10 mg/dL Final   08/07/2020 0.85 0.60 - 1.10 mg/dL Final       WBC   Date Value Ref Range Status   02/12/2021 4.8 4.0 - 11.0 thou/uL Final   11/13/2020 4.8 4.0 - 11.0 thou/uL  Final     Hemoglobin   Date Value Ref Range Status   02/12/2021 12.0 12.0 - 16.0 g/dL Final   11/13/2020 13.1 12.0 - 16.0 g/dL Final   08/07/2020 12.9 12.0 - 16.0 g/dL Final     Platelets   Date Value Ref Range Status   02/12/2021 320 140 - 440 thou/uL Final   11/13/2020 276 140 - 440 thou/uL Final   08/07/2020 278 140 - 440 thou/uL Final       Lab Results   Component Value Date    RF <15.0 06/11/2019    SEDRATE 12 11/08/2018

## 2021-06-16 PROBLEM — L40.9 PSORIASIS: Status: ACTIVE | Noted: 2019-08-01

## 2021-06-16 PROBLEM — E78.5 HYPERLIPIDEMIA: Status: ACTIVE | Noted: 2021-01-17

## 2021-06-16 PROBLEM — L40.50 PSA (PSORIATIC ARTHRITIS) (H): Status: ACTIVE | Noted: 2019-08-01

## 2021-06-16 PROBLEM — E11.9 TYPE 2 DIABETES MELLITUS WITHOUT COMPLICATION (H): Status: ACTIVE | Noted: 2021-01-17

## 2021-06-16 PROBLEM — S32.010A CLOSED COMPRESSION FRACTURE OF BODY OF L1 VERTEBRA (H): Status: ACTIVE | Noted: 2021-01-17

## 2021-06-16 PROBLEM — M19.042 LOCALIZED PRIMARY OSTEOARTHRITIS OF BOTH HANDS: Status: ACTIVE | Noted: 2019-04-30

## 2021-06-16 PROBLEM — M19.041 LOCALIZED PRIMARY OSTEOARTHRITIS OF BOTH HANDS: Status: ACTIVE | Noted: 2019-04-30

## 2021-06-16 PROBLEM — Z79.899 HIGH RISK MEDICATION USE: Status: ACTIVE | Noted: 2020-11-12

## 2021-07-03 NOTE — ADDENDUM NOTE
Addendum Note by Marcela Lambert RN at 4/30/2019  8:00 AM     Author: Marcela Lambert RN Service: -- Author Type: Registered Nurse    Filed: 5/2/2019  2:38 PM Encounter Date: 4/30/2019 Status: Signed    : Marcela Lambert RN (Registered Nurse)    Addended by: MARCELA LAMBERT on: 5/2/2019 02:38 PM        Modules accepted: Orders

## 2021-07-13 ENCOUNTER — RECORDS - HEALTHEAST (OUTPATIENT)
Dept: ADMINISTRATIVE | Facility: CLINIC | Age: 71
End: 2021-07-13

## 2021-07-21 ENCOUNTER — RECORDS - HEALTHEAST (OUTPATIENT)
Dept: ADMINISTRATIVE | Facility: CLINIC | Age: 71
End: 2021-07-21

## 2021-07-22 ENCOUNTER — RECORDS - HEALTHEAST (OUTPATIENT)
Dept: SCHEDULING | Facility: CLINIC | Age: 71
End: 2021-07-22

## 2021-07-22 DIAGNOSIS — Z12.31 OTHER SCREENING MAMMOGRAM: ICD-10-CM

## 2021-08-03 PROBLEM — M06.4 INFLAMMATORY POLYARTHRITIS (H): Status: RESOLVED | Noted: 2019-04-30 | Resolved: 2020-11-12

## 2021-09-13 ENCOUNTER — VIRTUAL VISIT (OUTPATIENT)
Dept: RHEUMATOLOGY | Facility: CLINIC | Age: 71
End: 2021-09-13
Payer: COMMERCIAL

## 2021-09-13 DIAGNOSIS — Z79.899 HIGH RISK MEDICATION USE: ICD-10-CM

## 2021-09-13 DIAGNOSIS — M19.041 LOCALIZED PRIMARY OSTEOARTHRITIS OF BOTH HANDS: ICD-10-CM

## 2021-09-13 DIAGNOSIS — L40.50 PSA (PSORIATIC ARTHRITIS) (H): Primary | ICD-10-CM

## 2021-09-13 DIAGNOSIS — L40.9 PSORIASIS: ICD-10-CM

## 2021-09-13 DIAGNOSIS — M19.042 LOCALIZED PRIMARY OSTEOARTHRITIS OF BOTH HANDS: ICD-10-CM

## 2021-09-13 PROCEDURE — 99214 OFFICE O/P EST MOD 30 MIN: CPT | Mod: 95 | Performed by: INTERNAL MEDICINE

## 2021-09-13 RX ORDER — FOLIC ACID 1 MG/1
1 TABLET ORAL DAILY
Qty: 90 TABLET | Refills: 0 | Status: SHIPPED | OUTPATIENT
Start: 2021-09-13 | End: 2023-03-15

## 2021-09-13 RX ORDER — CITALOPRAM HYDROBROMIDE 20 MG/1
TABLET ORAL
COMMUNITY

## 2021-09-13 RX ORDER — CYCLOBENZAPRINE HCL 10 MG
10 TABLET ORAL
COMMUNITY
Start: 2021-02-27 | End: 2024-02-08

## 2021-09-13 NOTE — PROGRESS NOTES
Ceci is a 71 year old who is being evaluated via a billable video visit.      How would you like to obtain your AVS? MyChart  If the video visit is dropped, the invitation should be resent by: Text to cell phone: [phone]  Will anyone else be joining your video visit? No      Video Start Time: 9:27 AM  Video-Visit Details    Type of service:  Video Visit    Video End Time:9:35 AM    Originating Location (pt. Location): Home    Distant Location (provider location):  [facility]     Platform used for Video Visit: [employer]    This document was created using a software with less than 100% fidelity, at times resulting in unintended, even erroneous syntax and grammar.  The reader is advised to keep this under consideration while reviewing, interpreting this note.           ASSESSMENT AND PLAN:    Diagnoses and all orders for this visit:  PSA (psoriatic arthritis) (H)  -     folic acid (FOLVITE) 1 MG tablet; Take 1 tablet (1 mg) by mouth daily  -     methotrexate 2.5 MG tablet; Take 3 tablets (7.5 mg) by mouth once a week  Localized primary osteoarthritis of both hands  Psoriasis  High risk medication use      Follow up in 6 months, labs every 3 months, follow-up in person.      HISTORY OF PRESENTING ILLNESS:  Ceci Garcia 71 year old is evaluated here via video/audio link.  This is for follow-up.  Despite repeated attempts the video partly connected poorly. She has psoriatic arthritis, psoriasis, osteoarthritis.  She is doing great.  She is on methotrexate 7.5 mg/week with folic acid.  She tried 5 mg/week but that caused her joint pains return.  She missed a lab appointment most recent labs are from June.  She is going to go to the lab tomorrow.  Should her labs are within acceptable range she will continue methotrexate. She is well aware of the management principles of OA.       ROS enquiry held for fever, ocular symptoms, rash, headache,  GI issues.  Today we also discussed the issues related  to the current pandemic, the pros and cons of the current treatment plan, the CDC guidelines such as social distancing washing the hands covering the cough.  ALLERGIES:Ciprofloxacin and Oxycodone-acetaminophen    PAST MEDICAL/ACTIVE PROBLEMS/MEDICATION/SOCIAL DATA  Past Medical History:   Diagnosis Date     Diabetes mellitus (H)      Hyperlipidemia      History   Smoking Status     Former Smoker   Smokeless Tobacco     Never Used     Patient Active Problem List   Diagnosis     Localized primary osteoarthritis of both hands     PSA (psoriatic arthritis) (H)     Psoriasis     High risk medication use     Closed compression fracture of body of L1 vertebra (H)     Type 2 diabetes mellitus without complication (H)     Hyperlipidemia     Current Outpatient Medications   Medication Sig Dispense Refill     acetaminophen (TYLENOL) 325 MG tablet [ACETAMINOPHEN (TYLENOL) 325 MG TABLET] Take 325 mg by mouth every 6 (six) hours as needed for pain.       aspirin 81 MG EC tablet [ASPIRIN 81 MG EC TABLET] Take 81 mg by mouth daily.        b complex vitamins capsule [B COMPLEX VITAMINS CAPSULE] Take 1 capsule by mouth daily.        calcitonin, salmon, (MIACALCIN) 200 unit/actuation nasal spray [CALCITONIN, SALMON, (MIACALCIN) 200 UNIT/ACTUATION NASAL SPRAY] Apply 1 spray into alternating nostrils daily. 3.7 mL 0     CALCIUM ORAL [CALCIUM ORAL] Take 1 tablet by mouth daily.        cholecalciferol, vitamin D3, (VITAMIN D3) 5,000 unit Tab [CHOLECALCIFEROL, VITAMIN D3, (VITAMIN D3) 5,000 UNIT TAB] Take 5,000 Units by mouth daily.        CONTOUR NEXT STRIPS strips [CONTOUR NEXT STRIPS STRIPS] USE AS DIRECTED 2 TIMES A DAY  5     cyanocobalamin, vitamin B-12, (VITAMIN B-12 ORAL) [CYANOCOBALAMIN, VITAMIN B-12, (VITAMIN B-12 ORAL)] Take 5,000 mcg by mouth daily.       estradiol (ESTRACE) 0.01 % (0.1 mg/gram) vaginal cream [ESTRADIOL (ESTRACE) 0.01 % (0.1 MG/GRAM) VAGINAL CREAM] Insert 1 g into the vagina every other day.        folic acid  (FOLVITE) 1 MG tablet [FOLIC ACID (FOLVITE) 1 MG TABLET] Take 1 tablet (1 mg total) by mouth daily. 90 tablet 0     glipiZIDE (GLUCOTROL) 10 MG 24 hr tablet [GLIPIZIDE (GLUCOTROL) 10 MG 24 HR TABLET] Take 10 mg by mouth daily.              HYDROmorphone (DILAUDID) 2 MG tablet [HYDROMORPHONE (DILAUDID) 2 MG TABLET] Take 0.5-1 tablets (1-2 mg total) by mouth every 4 (four) hours as needed. 12 tablet 0     insulin glargine (LANTUS SOLOSTAR) 100 unit/mL (3 mL) pen [INSULIN GLARGINE (LANTUS SOLOSTAR) 100 UNIT/ML (3 ML) PEN] Inject 19 Units under the skin at bedtime.        lidocaine (XYLOCAINE) 5 % ointment [LIDOCAINE (XYLOCAINE) 5 % OINTMENT] Apply to posterior neck 2 gms 4 times/day 150 g 0     LORazepam (ATIVAN) 0.5 MG tablet [LORAZEPAM (ATIVAN) 0.5 MG TABLET] Take 0.5 mg by mouth at bedtime.        metFORMIN (GLUCOPHAGE-XR) 500 MG 24 hr tablet [METFORMIN (GLUCOPHAGE-XR) 500 MG 24 HR TABLET] Take 1,000 mg by mouth daily with supper.       metFORMIN (GLUCOPHAGE-XR) 500 MG 24 hr tablet [METFORMIN (GLUCOPHAGE-XR) 500 MG 24 HR TABLET] Take 500 mg by mouth daily with breakfast.        methotrexate 2.5 MG tablet [METHOTREXATE 2.5 MG TABLET] Take 3 tablets (7.5 mg total) by mouth once a week. 36 tablet 0     simvastatin (ZOCOR) 20 MG tablet [SIMVASTATIN (ZOCOR) 20 MG TABLET] Take 20 mg by mouth at bedtime.                  EXAMINATION:    Using the audio and video link as best as possible the constitutional, neck, neurologic, psych, skin, both upper extremities areas/organ system were evaluated during this assessment.  Some of the important findings: Alert, oriented, speech fluent.   Able to fully flex the digits, into fists bilaterally, wrist and elbow range of motion appear normal, abduction of the shoulder is normal.      LAB / IMAGING DATA:  ALT   Date Value Ref Range Status   06/11/2021 12 0 - 45 U/L Final   02/12/2021 <9 0 - 45 U/L Final   11/13/2020 13 0 - 45 U/L Final     Albumin   Date Value Ref Range Status    06/11/2021 3.8 3.5 - 5.0 g/dL Final   02/12/2021 3.5 3.5 - 5.0 g/dL Final   11/13/2020 3.8 3.5 - 5.0 g/dL Final       WBC   Date Value Ref Range Status   06/11/2021 4.6 4.0 - 11.0 thou/uL Final   02/12/2021 4.8 4.0 - 11.0 thou/uL Final     Hemoglobin   Date Value Ref Range Status   06/11/2021 12.2 12.0 - 16.0 g/dL Final   02/12/2021 12.0 12.0 - 16.0 g/dL Final   11/13/2020 13.1 12.0 - 16.0 g/dL Final     Platelet Count   Date Value Ref Range Status   06/11/2021 236 140 - 440 thou/uL Final   02/12/2021 320 140 - 440 thou/uL Final   11/13/2020 276 140 - 440 thou/uL Final       No results found for: TAMIKO

## 2021-09-14 ENCOUNTER — APPOINTMENT (OUTPATIENT)
Dept: LAB | Facility: CLINIC | Age: 71
End: 2021-09-14
Payer: COMMERCIAL

## 2021-09-14 DIAGNOSIS — L40.50 PSA (PSORIATIC ARTHRITIS) (H): Primary | ICD-10-CM

## 2021-09-20 ENCOUNTER — LAB REQUISITION (OUTPATIENT)
Dept: LAB | Facility: CLINIC | Age: 71
End: 2021-09-20

## 2021-09-20 DIAGNOSIS — E11.9 TYPE 2 DIABETES MELLITUS WITHOUT COMPLICATIONS (H): ICD-10-CM

## 2021-09-20 DIAGNOSIS — E78.5 HYPERLIPIDEMIA, UNSPECIFIED: ICD-10-CM

## 2021-09-20 LAB
ALBUMIN SERPL-MCNC: 3.7 G/DL (ref 3.5–5)
ALP SERPL-CCNC: 50 U/L (ref 45–120)
ALT SERPL W P-5'-P-CCNC: 17 U/L (ref 0–45)
ANION GAP SERPL CALCULATED.3IONS-SCNC: 9 MMOL/L (ref 5–18)
AST SERPL W P-5'-P-CCNC: 16 U/L (ref 0–40)
BILIRUB SERPL-MCNC: 0.4 MG/DL (ref 0–1)
BUN SERPL-MCNC: 13 MG/DL (ref 8–28)
CALCIUM SERPL-MCNC: 9.3 MG/DL (ref 8.5–10.5)
CHLORIDE BLD-SCNC: 105 MMOL/L (ref 98–107)
CHOLEST SERPL-MCNC: 178 MG/DL
CO2 SERPL-SCNC: 26 MMOL/L (ref 22–31)
CREAT SERPL-MCNC: 0.8 MG/DL (ref 0.6–1.1)
GFR SERPL CREATININE-BSD FRML MDRD: 73 ML/MIN/1.73M2
GLUCOSE BLD-MCNC: 129 MG/DL (ref 70–125)
HDLC SERPL-MCNC: 54 MG/DL
LDLC SERPL CALC-MCNC: 95 MG/DL
POTASSIUM BLD-SCNC: 4.3 MMOL/L (ref 3.5–5)
PROT SERPL-MCNC: 6.4 G/DL (ref 6–8)
SODIUM SERPL-SCNC: 140 MMOL/L (ref 136–145)
TRIGL SERPL-MCNC: 146 MG/DL

## 2021-09-20 PROCEDURE — 36415 COLL VENOUS BLD VENIPUNCTURE: CPT | Performed by: FAMILY MEDICINE

## 2021-09-20 PROCEDURE — 80061 LIPID PANEL: CPT | Performed by: FAMILY MEDICINE

## 2021-09-20 PROCEDURE — 80053 COMPREHEN METABOLIC PANEL: CPT | Performed by: FAMILY MEDICINE

## 2021-12-13 ENCOUNTER — LAB (OUTPATIENT)
Dept: LAB | Facility: CLINIC | Age: 71
End: 2021-12-13
Payer: COMMERCIAL

## 2021-12-13 DIAGNOSIS — L40.50 PSA (PSORIATIC ARTHRITIS) (H): ICD-10-CM

## 2021-12-13 LAB
ALBUMIN SERPL-MCNC: 3.6 G/DL (ref 3.5–5)
ALT SERPL W P-5'-P-CCNC: 19 U/L (ref 0–45)
CREAT SERPL-MCNC: 0.87 MG/DL (ref 0.6–1.1)
ERYTHROCYTE [DISTWIDTH] IN BLOOD BY AUTOMATED COUNT: 13.7 % (ref 10–15)
GFR SERPL CREATININE-BSD FRML MDRD: 66 ML/MIN/1.73M2
HCT VFR BLD AUTO: 38.9 % (ref 35–47)
HGB BLD-MCNC: 12.4 G/DL (ref 11.7–15.7)
MCH RBC QN AUTO: 31.8 PG (ref 26.5–33)
MCHC RBC AUTO-ENTMCNC: 31.9 G/DL (ref 31.5–36.5)
MCV RBC AUTO: 100 FL (ref 78–100)
PLATELET # BLD AUTO: 253 10E3/UL (ref 150–450)
RBC # BLD AUTO: 3.9 10E6/UL (ref 3.8–5.2)
WBC # BLD AUTO: 4.3 10E3/UL (ref 4–11)

## 2021-12-13 PROCEDURE — 84460 ALANINE AMINO (ALT) (SGPT): CPT

## 2021-12-13 PROCEDURE — 82565 ASSAY OF CREATININE: CPT

## 2021-12-13 PROCEDURE — 36415 COLL VENOUS BLD VENIPUNCTURE: CPT

## 2021-12-13 PROCEDURE — 82040 ASSAY OF SERUM ALBUMIN: CPT

## 2021-12-13 PROCEDURE — 85027 COMPLETE CBC AUTOMATED: CPT

## 2021-12-30 DIAGNOSIS — L40.50 PSA (PSORIATIC ARTHRITIS) (H): ICD-10-CM

## 2021-12-30 RX ORDER — METHOTREXATE 2.5 MG/1
TABLET ORAL
Qty: 24 TABLET | Refills: 0 | Status: SHIPPED | OUTPATIENT
Start: 2021-12-30 | End: 2022-02-21

## 2022-02-20 DIAGNOSIS — L40.50 PSA (PSORIATIC ARTHRITIS) (H): ICD-10-CM

## 2022-02-21 RX ORDER — METHOTREXATE 2.5 MG/1
TABLET ORAL
Qty: 24 TABLET | Refills: 0 | Status: SHIPPED | OUTPATIENT
Start: 2022-02-21 | End: 2022-04-18

## 2022-02-21 NOTE — CONFIDENTIAL NOTE
Refilled per [facility] RN refill protocol  Labs 3/8/22 f/u 3/15/22 with Dr Puente

## 2022-03-08 ENCOUNTER — LAB (OUTPATIENT)
Dept: LAB | Facility: CLINIC | Age: 72
End: 2022-03-08
Payer: COMMERCIAL

## 2022-03-08 DIAGNOSIS — L40.50 PSA (PSORIATIC ARTHRITIS) (H): ICD-10-CM

## 2022-03-08 LAB
ALBUMIN SERPL-MCNC: 3.7 G/DL (ref 3.5–5)
ALT SERPL W P-5'-P-CCNC: <9 U/L (ref 0–45)
CREAT SERPL-MCNC: 1.01 MG/DL (ref 0.6–1.1)
ERYTHROCYTE [DISTWIDTH] IN BLOOD BY AUTOMATED COUNT: 13.5 % (ref 10–15)
GFR SERPL CREATININE-BSD FRML MDRD: 58 ML/MIN/1.73M2
HCT VFR BLD AUTO: 39.2 % (ref 35–47)
HGB BLD-MCNC: 12.9 G/DL (ref 11.7–15.7)
MCH RBC QN AUTO: 32.9 PG (ref 26.5–33)
MCHC RBC AUTO-ENTMCNC: 32.9 G/DL (ref 31.5–36.5)
MCV RBC AUTO: 100 FL (ref 78–100)
PLATELET # BLD AUTO: 268 10E3/UL (ref 150–450)
RBC # BLD AUTO: 3.92 10E6/UL (ref 3.8–5.2)
WBC # BLD AUTO: 6 10E3/UL (ref 4–11)

## 2022-03-08 PROCEDURE — 85027 COMPLETE CBC AUTOMATED: CPT

## 2022-03-08 PROCEDURE — 36415 COLL VENOUS BLD VENIPUNCTURE: CPT

## 2022-03-08 PROCEDURE — 82565 ASSAY OF CREATININE: CPT

## 2022-03-08 PROCEDURE — 84460 ALANINE AMINO (ALT) (SGPT): CPT

## 2022-03-08 PROCEDURE — 82040 ASSAY OF SERUM ALBUMIN: CPT

## 2022-03-15 ENCOUNTER — OFFICE VISIT (OUTPATIENT)
Dept: RHEUMATOLOGY | Facility: CLINIC | Age: 72
End: 2022-03-15
Payer: COMMERCIAL

## 2022-03-15 VITALS
HEART RATE: 92 BPM | SYSTOLIC BLOOD PRESSURE: 118 MMHG | DIASTOLIC BLOOD PRESSURE: 72 MMHG | HEIGHT: 62 IN | WEIGHT: 139.5 LBS | BODY MASS INDEX: 25.67 KG/M2

## 2022-03-15 DIAGNOSIS — Z79.899 HIGH RISK MEDICATION USE: ICD-10-CM

## 2022-03-15 DIAGNOSIS — M19.042 LOCALIZED PRIMARY OSTEOARTHRITIS OF BOTH HANDS: ICD-10-CM

## 2022-03-15 DIAGNOSIS — L40.9 PSORIASIS: ICD-10-CM

## 2022-03-15 DIAGNOSIS — L40.50 PSA (PSORIATIC ARTHRITIS) (H): Primary | ICD-10-CM

## 2022-03-15 DIAGNOSIS — M19.041 LOCALIZED PRIMARY OSTEOARTHRITIS OF BOTH HANDS: ICD-10-CM

## 2022-03-15 PROCEDURE — 99214 OFFICE O/P EST MOD 30 MIN: CPT | Performed by: INTERNAL MEDICINE

## 2022-03-15 RX ORDER — HYDROCODONE BITARTRATE AND ACETAMINOPHEN 5; 325 MG/1; MG/1
1 TABLET ORAL EVERY 6 HOURS PRN
COMMUNITY
End: 2024-02-08

## 2022-03-15 RX ORDER — METHYLPREDNISOLONE 4 MG
4 TABLET, DOSE PACK ORAL SEE ADMIN INSTRUCTIONS
COMMUNITY
End: 2024-02-08

## 2022-03-15 NOTE — PROGRESS NOTES
"      Rheumatology follow-up visit note     Ceci is a 71 year old female presents today for follow-up.    Ceci was seen today for recheck.    Diagnoses and all orders for this visit:    PSA (psoriatic arthritis) (H)    Localized primary osteoarthritis of both hands    Psoriasis    High risk medication use        Well-controlled psoriatic arthritis on a modest dose of methotrexate she is to continue as now with folic acid recheck in 6 months labs every 3 months.  Management of OA reviewed.    Follow up in 6 months.    HPI    Ceci Garcia is a 71 year old female is here for follow-up.  She has psoriatic arthritis, osteoarthritis on methotrexate 7.5 mg/week with folic acid.  She has experienced no flareup of her joints in this context.  Over about a week she has had low back pain that is radiating down her right lower extremity up to the knee reminiscent of her previous episode of sciatica.  Recent labs are within acceptable range. She is well aware of the management principles of OA.            DETAILED EXAMINATION  03/15/22  :    Vitals:    03/15/22 1012   BP: 118/72   BP Location: Right arm   Patient Position: Sitting   Cuff Size: Adult Regular   Pulse: 92   Weight: 63.3 kg (139 lb 8 oz)   Height: 1.575 m (5' 2\")     Alert oriented. Head including the face is examined for malar rash, heliotropes, scarring, lupus pernio. Eyes examined for redness such as in episcleritis/scleritis, periorbital lesions.   Neck/ Face examined for parotid gland swelling, range of motion of neck.  Left upper and lower and right upper and lower extremities examined for tenderness, swelling, warmth of the appendicular joints, range of motion, edema, rash.  Some of the important findings included: she does not have evidence of synovitis in the palpable joints of the upper extremities.  No significant deformities of the digits.  + [place] nodes.  Range of motion of the shoulders  show full abduction.  No JLT effusion or warmth of the knees. "  she does not have dactylitis of the digits.     Patient Active Problem List    Diagnosis Date Noted     Closed compression fracture of body of L1 vertebra (H) 2021     Priority: Medium     Type 2 diabetes mellitus without complication (H) 2021     Priority: Medium     Hyperlipidemia 2021     Priority: Medium     High risk medication use 2020     Priority: Medium     PSA (psoriatic arthritis) (H) 2019     Priority: Medium     Psoriasis 2019     Priority: Medium     Localized primary osteoarthritis of both hands 2019     Priority: Medium     Past Surgical History:   Procedure Laterality Date     BIOPSY BREAST       BREAST CYST ASPIRATION       BREAST CYST EXCISION        SECTION       ECTOPIC PREGNANCY SURGERY       KIDNEY SURGERY        Past Medical History:   Diagnosis Date     Diabetes mellitus (H)      Hyperlipidemia      Allergies   Allergen Reactions     Ciprofloxacin Anaphylaxis, Hives, Shortness Of Breath and Swelling     Oxycodone-Aspirin      Other reaction(s): breathing issues     Oxycodone-Acetaminophen Rash     Current Outpatient Medications   Medication Sig Dispense Refill     acetaminophen (TYLENOL) 325 MG tablet [ACETAMINOPHEN (TYLENOL) 325 MG TABLET] Take 325 mg by mouth every 6 (six) hours as needed for pain.       aspirin 81 MG EC tablet [ASPIRIN 81 MG EC TABLET] Take 81 mg by mouth daily.        b complex vitamins capsule [B COMPLEX VITAMINS CAPSULE] Take 1 capsule by mouth daily.        calcitonin, salmon, (MIACALCIN) 200 unit/actuation nasal spray [CALCITONIN, SALMON, (MIACALCIN) 200 UNIT/ACTUATION NASAL SPRAY] Apply 1 spray into alternating nostrils daily. 3.7 mL 0     CALCIUM ORAL [CALCIUM ORAL] Take 1 tablet by mouth daily.        cholecalciferol, vitamin D3, (VITAMIN D3) 5,000 unit Tab [CHOLECALCIFEROL, VITAMIN D3, (VITAMIN D3) 5,000 UNIT TAB] Take 5,000 Units by mouth daily.        citalopram (CELEXA) 20 MG tablet 1-2 tab(s)       CONTOUR  NEXT STRIPS strips [CONTOUR NEXT STRIPS STRIPS] USE AS DIRECTED 2 TIMES A DAY  5     cyanocobalamin, vitamin B-12, (VITAMIN B-12 ORAL) [CYANOCOBALAMIN, VITAMIN B-12, (VITAMIN B-12 ORAL)] Take 5,000 mcg by mouth daily.       cyclobenzaprine (FLEXERIL) 10 MG tablet Take 10 mg by mouth       estradiol (ESTRACE) 0.01 % (0.1 mg/gram) vaginal cream [ESTRADIOL (ESTRACE) 0.01 % (0.1 MG/GRAM) VAGINAL CREAM] Insert 1 g into the vagina every other day.        folic acid (FOLVITE) 1 MG tablet Take 1 tablet (1 mg) by mouth daily 90 tablet 0     glipiZIDE (GLUCOTROL) 10 MG 24 hr tablet [GLIPIZIDE (GLUCOTROL) 10 MG 24 HR TABLET] Take 10 mg by mouth daily.              HYDROcodone-acetaminophen (NORCO) 5-325 MG tablet Take 1 tablet by mouth every 6 hours as needed for severe pain       HYDROmorphone (DILAUDID) 2 MG tablet [HYDROMORPHONE (DILAUDID) 2 MG TABLET] Take 0.5-1 tablets (1-2 mg total) by mouth every 4 (four) hours as needed. 12 tablet 0     insulin glargine (LANTUS SOLOSTAR) 100 unit/mL (3 mL) pen [INSULIN GLARGINE (LANTUS SOLOSTAR) 100 UNIT/ML (3 ML) PEN] Inject 19 Units under the skin at bedtime.        lidocaine (XYLOCAINE) 5 % ointment [LIDOCAINE (XYLOCAINE) 5 % OINTMENT] Apply to posterior neck 2 gms 4 times/day 150 g 0     LORazepam (ATIVAN) 0.5 MG tablet [LORAZEPAM (ATIVAN) 0.5 MG TABLET] Take 0.5 mg by mouth at bedtime.        metFORMIN (GLUCOPHAGE-XR) 500 MG 24 hr tablet [METFORMIN (GLUCOPHAGE-XR) 500 MG 24 HR TABLET] Take 1,000 mg by mouth daily with supper.       metFORMIN (GLUCOPHAGE-XR) 500 MG 24 hr tablet [METFORMIN (GLUCOPHAGE-XR) 500 MG 24 HR TABLET] Take 500 mg by mouth daily with breakfast.        methotrexate sodium 2.5 MG TABS TAKE 3 TABLETS (7.5 MG) BY MOUTH ONCE A WEEK 24 tablet 0     methylPREDNISolone (MEDROL DOSEPAK) 4 MG tablet therapy pack Take 4 mg by mouth See Admin Instructions Follow Package Directions       simvastatin (ZOCOR) 20 MG tablet [SIMVASTATIN (ZOCOR) 20 MG TABLET] Take 20 mg by  mouth at bedtime.              PREDNISONE PO Taper to off (Patient not taking: Reported on 3/15/2022)       family history includes Dementia in her mother; Heart Disease in her father; Hypertension in her mother; No Known Problems in her daughter.  Social Connections: Not on file          WBC Count   Date Value Ref Range Status   03/08/2022 6.0 4.0 - 11.0 10e3/uL Final     RBC Count   Date Value Ref Range Status   03/08/2022 3.92 3.80 - 5.20 10e6/uL Final     Hemoglobin   Date Value Ref Range Status   03/08/2022 12.9 11.7 - 15.7 g/dL Final     Hematocrit   Date Value Ref Range Status   03/08/2022 39.2 35.0 - 47.0 % Final     MCV   Date Value Ref Range Status   03/08/2022 100 78 - 100 fL Final     MCH   Date Value Ref Range Status   03/08/2022 32.9 26.5 - 33.0 pg Final     Platelet Count   Date Value Ref Range Status   03/08/2022 268 150 - 450 10e3/uL Final     % Lymphocytes   Date Value Ref Range Status   06/11/2019 16 (L) 20 - 40 % Final     AST   Date Value Ref Range Status   09/20/2021 16 0 - 40 U/L Final     ALT   Date Value Ref Range Status   03/08/2022 <9 0 - 45 U/L Final     Albumin   Date Value Ref Range Status   03/08/2022 3.7 3.5 - 5.0 g/dL Final     Alkaline Phosphatase   Date Value Ref Range Status   09/20/2021 50 45 - 120 U/L Final     Creatinine   Date Value Ref Range Status   03/08/2022 1.01 0.60 - 1.10 mg/dL Final     GFR Estimate   Date Value Ref Range Status   03/08/2022 58 (L) >60 mL/min/1.73m2 Final     Comment:     Effective December 21, 2021 eGFRcr in adults is calculated using the 2021 CKD-EPI creatinine equation which includes age and gender (Jayda et al., NEJ, DOI: 10.1056/UAMAfc4483065)   06/11/2021 >60 >60 mL/min/1.73m2 Final     GFR Estimate If Black   Date Value Ref Range Status   06/11/2021 >60 >60 mL/min/1.73m2 Final     Erythrocyte Sedimentation Rate   Date Value Ref Range Status   11/08/2018 12 0 - 20 mm/hr Final     CRP   Date Value Ref Range Status   11/08/2018 1.1 (H) 0.0 - 0.8  mg/dL Final

## 2022-03-16 VITALS
WEIGHT: 140 LBS | OXYGEN SATURATION: 97 % | SYSTOLIC BLOOD PRESSURE: 174 MMHG | BODY MASS INDEX: 24.8 KG/M2 | DIASTOLIC BLOOD PRESSURE: 82 MMHG | TEMPERATURE: 98 F | RESPIRATION RATE: 18 BRPM | HEART RATE: 92 BPM | HEIGHT: 63 IN

## 2022-03-16 PROCEDURE — 99284 EMERGENCY DEPT VISIT MOD MDM: CPT

## 2022-03-17 ENCOUNTER — HOSPITAL ENCOUNTER (EMERGENCY)
Facility: HOSPITAL | Age: 72
Discharge: HOME | End: 2022-03-17
Attending: EMERGENCY MEDICINE | Admitting: EMERGENCY MEDICINE
Payer: COMMERCIAL

## 2022-03-17 ENCOUNTER — APPOINTMENT (OUTPATIENT)
Dept: MRI IMAGING | Facility: HOSPITAL | Age: 72
End: 2022-03-17
Attending: EMERGENCY MEDICINE
Payer: COMMERCIAL

## 2022-03-17 DIAGNOSIS — M54.31 SCIATIC NERVE PAIN, RIGHT: ICD-10-CM

## 2022-03-17 DIAGNOSIS — S32.010A COMPRESSION FRACTURE OF L1 LUMBAR VERTEBRA, CLOSED, INITIAL ENCOUNTER (H): ICD-10-CM

## 2022-03-17 PROCEDURE — 250N000013 HC RX MED GY IP 250 OP 250 PS 637: Performed by: EMERGENCY MEDICINE

## 2022-03-17 PROCEDURE — 72148 MRI LUMBAR SPINE W/O DYE: CPT

## 2022-03-17 PROCEDURE — 250N000011 HC RX IP 250 OP 636: Performed by: EMERGENCY MEDICINE

## 2022-03-17 RX ORDER — DIAZEPAM 5 MG
5 TABLET ORAL ONCE
Status: COMPLETED | OUTPATIENT
Start: 2022-03-17 | End: 2022-03-17

## 2022-03-17 RX ORDER — HYDROCODONE BITARTRATE AND ACETAMINOPHEN 5; 325 MG/1; MG/1
1 TABLET ORAL ONCE
Status: COMPLETED | OUTPATIENT
Start: 2022-03-17 | End: 2022-03-17

## 2022-03-17 RX ORDER — DIAZEPAM 5 MG
5 TABLET ORAL EVERY 6 HOURS PRN
Qty: 20 TABLET | Refills: 0 | Status: SHIPPED | OUTPATIENT
Start: 2022-03-17

## 2022-03-17 RX ORDER — HYDROCODONE BITARTRATE AND ACETAMINOPHEN 5; 325 MG/1; MG/1
1 TABLET ORAL EVERY 6 HOURS PRN
Qty: 10 TABLET | Refills: 0 | Status: SHIPPED | OUTPATIENT
Start: 2022-03-17 | End: 2022-03-20

## 2022-03-17 RX ORDER — ACETAMINOPHEN 325 MG/1
650 TABLET ORAL ONCE
Status: COMPLETED | OUTPATIENT
Start: 2022-03-17 | End: 2022-03-17

## 2022-03-17 RX ORDER — ONDANSETRON 8 MG/1
8 TABLET, ORALLY DISINTEGRATING ORAL EVERY 8 HOURS PRN
Qty: 12 TABLET | Refills: 0 | Status: SHIPPED | OUTPATIENT
Start: 2022-03-17

## 2022-03-17 RX ORDER — ONDANSETRON 4 MG/1
4 TABLET, ORALLY DISINTEGRATING ORAL ONCE
Status: COMPLETED | OUTPATIENT
Start: 2022-03-17 | End: 2022-03-17

## 2022-03-17 RX ADMIN — DIAZEPAM 5 MG: 5 TABLET ORAL at 05:58

## 2022-03-17 RX ADMIN — ONDANSETRON 4 MG: 4 TABLET, ORALLY DISINTEGRATING ORAL at 03:57

## 2022-03-17 RX ADMIN — HYDROCODONE BITARTRATE AND ACETAMINOPHEN 1 TABLET: 5; 325 TABLET ORAL at 03:57

## 2022-03-17 RX ADMIN — ACETAMINOPHEN 650 MG: 325 TABLET ORAL at 03:56

## 2022-03-17 RX ADMIN — DIAZEPAM 5 MG: 5 TABLET ORAL at 03:57

## 2022-03-17 ASSESSMENT — ENCOUNTER SYMPTOMS
NAUSEA: 1
NUMBNESS: 0

## 2022-03-17 NOTE — ED NOTES
Orders now ordered for patient who has been here 5.5 hours. Patient care has begun. MRI checklist complete and given to [facility] [place].

## 2022-03-17 NOTE — ED PROVIDER NOTES
EMERGENCY DEPARTMENT ENCOUNTER      NAME: Ceci Garcia  AGE: 71 year old female  YOB: 1950  MRN: [id number]  EVALUATION DATE & TIME: 3/17/2022  3:12 AM    PCP: Basia Elmore    ED PROVIDER: Sinai Ashley M.D.      Chief Complaint   Patient presents with     Leg Pain     sciatic          FINAL IMPRESSION:  1. Subacute Compression fracture of L1 lumbar vertebra, closed, sequela encounter (H)    2. Sciatic nerve pain, right        MEDICAL DECISION MAKING:  Pertinent Labs & Imaging studies reviewed. (See chart for details)  ED Course as of 03/17/22 0643   Thu Mar 17, 2022   0310 Afebrile.  Vital signs here with some mild hypertension, otherwise within normal limits.  Patient presenting for the onset of right-sided sciatic pain.  She has had this pain ongoing for the past 2 weeks now.  Absolutely no improvement with medications.  She was prescribed Medrol Dosepak, narcotic pain medication at urgent care.  States that she has been taking this, as having absolutely no improvement.  At home is been crying secondary to pain, has been unable to sleep.  She does not have any numbness.  No bowel or bladder incontinence.  She stated she initially first had a bout of sciatic pain about a year and a half ago when she fell out of a chair and broke vertebrae.  No recent falls or trauma.Physical exam for patient here without any midline lumbar tenderness however does have some paraspinal tenderness and tenderness over her sciatic notch on palpation.  She is able to ambulate without any difficulty.  No sensory deficit.Given the fact that she has been having this persistent pain with absolutely no improvement with medications we will get an MRI.  We will treat with Valium, Norco here to see if this does help.  She is slightly nauseated secondary to pain so we will give her Zofran as well.     Patient's MRI here shows subacute fracture of L1, slightly worsening from previous.  She states she has been going to  see chiropractor secondary to this sciatic pain in her back and is wondering if that could be what made this worse.  Given the fact that it is subacute could be the cause for patient's pain that started about 2 weeks ago.  She does not recall any falls or trauma.  Otherwise no signs for cauda equina.  With medications given here she is comfortable, she feels comfortable with discharge to home with follow-up with [facility].  Will discharge at this time with medications for symptom control.      Critical care: 0 minutes excluding separately billable procedures.  Includes bedside management, time reviewing test results, review of records, discussing the case with staff, documenting the medical record and time spent with family members (or surrogate decision makers) discussing specific treatment issues.      ED Course:  2:30 AM I met with the patient, obtained history, performed an initial exam, and discussed options and plan for diagnostics and treatment here in the ED.   4:13 AM I went to update the patient.   5:42 AM I went to recheck on the patient and update on results.     The importance of close follow up was discussed. We reviewed warning signs and symptoms, and I instructed Ms. Garcia to return to the emergency department immediately if she develops any new or worsening symptoms. I provided additional verbal discharge instructions. Ms. Garcia expressed understanding and agreement with this plan of care, her questions were answered, and she was discharged in stable condition.     MEDICATIONS GIVEN IN THE EMERGENCY:  Medications   diazepam (VALIUM) tablet 5 mg (5 mg Oral Given 3/17/22 0357)   ondansetron (ZOFRAN-ODT) ODT tab 4 mg (4 mg Oral Given 3/17/22 0357)   HYDROcodone-acetaminophen (NORCO) 5-325 MG per tablet 1 tablet (1 tablet Oral Given 3/17/22 0357)   acetaminophen (TYLENOL) tablet 650 mg (650 mg Oral Given 3/17/22 0356)   diazepam (VALIUM) tablet 5 mg (5 mg Oral Given 3/17/22 1002)        NEW PRESCRIPTIONS STARTED AT TODAY'S ER VISIT:  Discharge Medication List as of 3/17/2022  6:02 AM      START taking these medications    Details   diazepam (VALIUM) 5 MG tablet Take 1 tablet (5 mg) by mouth every 6 hours as needed for muscle spasms or pain, Disp-20 tablet, R-0, Local Print      !! HYDROcodone-acetaminophen (NORCO) 5-325 MG tablet Take 1 tablet by mouth every 6 hours as needed for severe pain, Disp-10 tablet, R-0, Local Print      ondansetron (ZOFRAN-ODT) 8 MG ODT tab Take 1 tablet (8 mg) by mouth every 8 hours as needed for nausea, Disp-12 tablet, R-0, Local Print       !! - Potential duplicate medications found. Please discuss with provider.             =================================================================    HPI  Patient information was obtained from: Patient    Use of : N/A         Ceci Garcia is a 71 year old female with a history of diabetes, HLP, and sciatica, who presents with leg pain.    The patient reports worsening right lower back pain radiating down the right leg to the knee.  She reports a history of sciatica with a fractured vertebrae.  She reports being seen last week and started on steroid dose pack and Hydrocodone.  She reports the Hydrocodone has only minimally helped with her pain.  She does note some nausea secondary to her pain.  She denies any recent imaging of her back.  No numbness, incontinence, or other complaints at this time.            REVIEW OF SYSTEMS   Review of Systems   Gastrointestinal: Positive for nausea (secondary to pain).   Genitourinary:        Negative for incontinence    Musculoskeletal:        Positive for right lower back pain, radiating into right leg to the knee    Neurological: Negative for numbness.   All other systems reviewed and are negative.    PAST MEDICAL HISTORY:  Past Medical History:   Diagnosis Date     Diabetes mellitus (H)      Hyperlipidemia        PAST SURGICAL HISTORY:  Past Surgical History:   Procedure  Laterality Date     BIOPSY BREAST       BREAST CYST ASPIRATION       BREAST CYST EXCISION        SECTION       ECTOPIC PREGNANCY SURGERY       KIDNEY SURGERY         CURRENT MEDICATIONS:    No current facility-administered medications for this encounter.    Current Outpatient Medications:      diazepam (VALIUM) 5 MG tablet, Take 1 tablet (5 mg) by mouth every 6 hours as needed for muscle spasms or pain, Disp: 20 tablet, Rfl: 0     HYDROcodone-acetaminophen (NORCO) 5-325 MG tablet, Take 1 tablet by mouth every 6 hours as needed for severe pain, Disp: 10 tablet, Rfl: 0     ondansetron (ZOFRAN-ODT) 8 MG ODT tab, Take 1 tablet (8 mg) by mouth every 8 hours as needed for nausea, Disp: 12 tablet, Rfl: 0     acetaminophen (TYLENOL) 325 MG tablet, [ACETAMINOPHEN (TYLENOL) 325 MG TABLET] Take 325 mg by mouth every 6 (six) hours as needed for pain., Disp: , Rfl:      aspirin 81 MG EC tablet, [ASPIRIN 81 MG EC TABLET] Take 81 mg by mouth daily. , Disp: , Rfl:      b complex vitamins capsule, [B COMPLEX VITAMINS CAPSULE] Take 1 capsule by mouth daily. , Disp: , Rfl:      calcitonin, salmon, (MIACALCIN) 200 unit/actuation nasal spray, [CALCITONIN, SALMON, (MIACALCIN) 200 UNIT/ACTUATION NASAL SPRAY] Apply 1 spray into alternating nostrils daily., Disp: 3.7 mL, Rfl: 0     CALCIUM ORAL, [CALCIUM ORAL] Take 1 tablet by mouth daily. , Disp: , Rfl:      cholecalciferol, vitamin D3, (VITAMIN D3) 5,000 unit Tab, [CHOLECALCIFEROL, VITAMIN D3, (VITAMIN D3) 5,000 UNIT TAB] Take 5,000 Units by mouth daily. , Disp: , Rfl:      citalopram (CELEXA) 20 MG tablet, 1-2 tab(s), Disp: , Rfl:      CONTOUR NEXT STRIPS strips, [CONTOUR NEXT STRIPS STRIPS] USE AS DIRECTED 2 TIMES A DAY, Disp: , Rfl: 5     cyanocobalamin, vitamin B-12, (VITAMIN B-12 ORAL), [CYANOCOBALAMIN, VITAMIN B-12, (VITAMIN B-12 ORAL)] Take 5,000 mcg by mouth daily., Disp: , Rfl:      cyclobenzaprine (FLEXERIL) 10 MG tablet, Take 10 mg by mouth, Disp: , Rfl:      estradiol  (ESTRACE) 0.01 % (0.1 mg/gram) vaginal cream, [ESTRADIOL (ESTRACE) 0.01 % (0.1 MG/GRAM) VAGINAL CREAM] Insert 1 g into the vagina every other day. , Disp: , Rfl:      folic acid (FOLVITE) 1 MG tablet, Take 1 tablet (1 mg) by mouth daily, Disp: 90 tablet, Rfl: 0     glipiZIDE (GLUCOTROL) 10 MG 24 hr tablet, [GLIPIZIDE (GLUCOTROL) 10 MG 24 HR TABLET] Take 10 mg by mouth daily.       , Disp: , Rfl:      HYDROcodone-acetaminophen (NORCO) 5-325 MG tablet, Take 1 tablet by mouth every 6 hours as needed for severe pain, Disp: , Rfl:      HYDROmorphone (DILAUDID) 2 MG tablet, [HYDROMORPHONE (DILAUDID) 2 MG TABLET] Take 0.5-1 tablets (1-2 mg total) by mouth every 4 (four) hours as needed., Disp: 12 tablet, Rfl: 0     insulin glargine (LANTUS SOLOSTAR) 100 unit/mL (3 mL) pen, [INSULIN GLARGINE (LANTUS SOLOSTAR) 100 UNIT/ML (3 ML) PEN] Inject 19 Units under the skin at bedtime. , Disp: , Rfl:      lidocaine (XYLOCAINE) 5 % ointment, [LIDOCAINE (XYLOCAINE) 5 % OINTMENT] Apply to posterior neck 2 gms 4 times/day, Disp: 150 g, Rfl: 0     LORazepam (ATIVAN) 0.5 MG tablet, [LORAZEPAM (ATIVAN) 0.5 MG TABLET] Take 0.5 mg by mouth at bedtime. , Disp: , Rfl:      metFORMIN (GLUCOPHAGE-XR) 500 MG 24 hr tablet, [METFORMIN (GLUCOPHAGE-XR) 500 MG 24 HR TABLET] Take 1,000 mg by mouth daily with supper., Disp: , Rfl:      metFORMIN (GLUCOPHAGE-XR) 500 MG 24 hr tablet, [METFORMIN (GLUCOPHAGE-XR) 500 MG 24 HR TABLET] Take 500 mg by mouth daily with breakfast. , Disp: , Rfl:      methotrexate sodium 2.5 MG TABS, TAKE 3 TABLETS (7.5 MG) BY MOUTH ONCE A WEEK, Disp: 24 tablet, Rfl: 0     methylPREDNISolone (MEDROL DOSEPAK) 4 MG tablet therapy pack, Take 4 mg by mouth See Admin Instructions Follow Package Directions, Disp: , Rfl:      PREDNISONE PO, Taper to off (Patient not taking: Reported on 3/15/2022), Disp: , Rfl:      simvastatin (ZOCOR) 20 MG tablet, [SIMVASTATIN (ZOCOR) 20 MG TABLET] Take 20 mg by mouth at bedtime.       , Disp: , Rfl:  "    ALLERGIES:  Allergies   Allergen Reactions     Ciprofloxacin Anaphylaxis, Hives, Shortness Of Breath and Swelling     Oxycodone-Aspirin      Other reaction(s): breathing issues     Oxycodone-Acetaminophen Rash       FAMILY HISTORY:  Family History   Problem Relation Age of Onset     Dementia Mother      Hypertension Mother      Heart Disease Father      No Known Problems Daughter      Hereditary Breast and Ovarian Cancer Syndrome No family hx of      Breast Cancer No family hx of      Cancer No family hx of      Colon Cancer No family hx of      Endometrial Cancer No family hx of      Ovarian Cancer No family hx of        SOCIAL HISTORY:   Social History     Socioeconomic History     Marital status:      Spouse name: Not on file     Number of children: Not on file     Years of education: Not on file     Highest education level: Not on file   Occupational History     Not on file   Tobacco Use     Smoking status: Former Smoker     Smokeless tobacco: Never Used   Substance and Sexual Activity     Alcohol use: Yes     Drug use: Not on file     Sexual activity: Not on file   Other Topics Concern     Not on file   Social History Narrative     Not on file     Social Determinants of Health     Financial Resource Strain: Not on file   Food Insecurity: Not on file   Transportation Needs: Not on file   Physical Activity: Not on file   Stress: Not on file   Social Connections: Not on file   Intimate Partner Violence: Not on file   Housing Stability: Not on file       PHYSICAL EXAM:    Vitals: BP (!) 174/82   Pulse 92   Temp 98  F (36.7  C) (Temporal)   Resp 18   Ht 1.6 m (5' 3\")   Wt 63.5 kg (140 lb)   SpO2 97%   BMI 24.80 kg/m     General:. Alert and interactive, comfortable appearing.  HENT: Oropharynx without erythema or exudates. MMM.  TMs clear bilaterally.  Eyes: Pupils mid-sized and equally reactive.   Neck: Full AROM.  No midline tenderness to palpation.  Cardiovascular: Regular rate and rhythm. " Peripheral pulses 2+ bilaterally.  Chest/Pulmonary: Normal work of breathing. Lung sounds clear and equal throughout, no wheezes or crackles. No chest wall tenderness or deformities.  Abdomen: Soft, nondistended. Nontender without guarding or rebound.  Back/Spine: No CVA or midline tenderness. patient here without any midline lumbar tenderness however does have some paraspinal tenderness and tenderness over her sciatic notch on palpation.  She is able to ambulate without any difficulty  Extremities: Normal ROM of all major joints. No lower extremity edema.   Skin: Warm and dry. Normal skin color.   Neuro: Speech clear. CNs grossly intact. Moves all extremities appropriately. Strength and sensation grossly intact to all extremities.   Psych: Normal affect/mood, cooperative, memory appropriate.     LAB:  All pertinent labs reviewed and interpreted.  Labs Ordered and Resulted from Time of ED Arrival to Time of ED Departure - No data to display    RADIOLOGY:  Lumbar spine MRI w/o contrast   Final Result   IMPRESSION:   1.  At L4-L5, there is mild to moderate central canal stenosis as well as narrowing of the lateral recesses, right greater than left with evidence of mass effect on the traversing right L5 nerve roots. In addition, there is moderate narrowing of the    right neural foramen with some distortion of the right L4 dorsal root ganglion.   2.  At the L1 level, there is a chronic superior endplate compression fracture. There has been further height loss compared with the original study of 01/17/2021 as well as some marrow edema along the ventral aspect of the superior L1 endplate. Although    favored to reflect a reactive change, the presence of the edema as well as increased height loss compared with the original study would raise the possibility of an acute or subacute on chronic injury.   3.  At L5-S1, there is a small left paracentral disc extrusion resulting in mass effect on the traversing left S1 nerve  roots.                            EKG:  See MDM    PROCEDURES:   None    I, Trung Hajioney, am serving as a scribe to document services personally performed by Dr. Sinai Ashley  based on my observation and the provider's statements to me. I, Sinai Ashley MD attest that Trung Hajioney is acting in a scribe capacity, has observed my performance of the services and has documented them in accordance with my direction.      Sinai Ashley M.D.  Emergency Medicine  [facility] EMERGENCY DEPARTMENT  [address] [place] [id number]  [phone]  Dept: [phone] s     Malissa Ashley MD  03/17/22 0670

## 2022-03-17 NOTE — ED TRIAGE NOTES
Pt presents with right sided sciatic pain. Pain started 10 days and gradually increased in severity. Pt was seen in Urgent care and was told to come to ED for treatment.

## 2022-04-17 DIAGNOSIS — L40.50 PSA (PSORIATIC ARTHRITIS) (H): ICD-10-CM

## 2022-04-18 RX ORDER — METHOTREXATE 2.5 MG/1
TABLET ORAL
Qty: 24 TABLET | Refills: 0 | Status: SHIPPED | OUTPATIENT
Start: 2022-04-18 | End: 2022-07-01

## 2022-06-15 ENCOUNTER — LAB (OUTPATIENT)
Dept: LAB | Facility: CLINIC | Age: 72
End: 2022-06-15
Payer: COMMERCIAL

## 2022-06-15 DIAGNOSIS — L40.50 PSA (PSORIATIC ARTHRITIS) (H): ICD-10-CM

## 2022-06-15 LAB
ALBUMIN SERPL-MCNC: 3.7 G/DL (ref 3.5–5)
ALT SERPL W P-5'-P-CCNC: <9 U/L (ref 0–45)
CREAT SERPL-MCNC: 0.99 MG/DL (ref 0.6–1.1)
ERYTHROCYTE [DISTWIDTH] IN BLOOD BY AUTOMATED COUNT: 13.1 % (ref 10–15)
GFR SERPL CREATININE-BSD FRML MDRD: 60 ML/MIN/1.73M2
HCT VFR BLD AUTO: 37.8 % (ref 35–47)
HGB BLD-MCNC: 12.5 G/DL (ref 11.7–15.7)
MCH RBC QN AUTO: 32.2 PG (ref 26.5–33)
MCHC RBC AUTO-ENTMCNC: 33.1 G/DL (ref 31.5–36.5)
MCV RBC AUTO: 97 FL (ref 78–100)
PLATELET # BLD AUTO: 289 10E3/UL (ref 150–450)
RBC # BLD AUTO: 3.88 10E6/UL (ref 3.8–5.2)
WBC # BLD AUTO: 5.5 10E3/UL (ref 4–11)

## 2022-06-15 PROCEDURE — 82040 ASSAY OF SERUM ALBUMIN: CPT

## 2022-06-15 PROCEDURE — 82565 ASSAY OF CREATININE: CPT

## 2022-06-15 PROCEDURE — 36415 COLL VENOUS BLD VENIPUNCTURE: CPT

## 2022-06-15 PROCEDURE — 85027 COMPLETE CBC AUTOMATED: CPT

## 2022-06-15 PROCEDURE — 84460 ALANINE AMINO (ALT) (SGPT): CPT

## 2022-07-01 DIAGNOSIS — L40.50 PSA (PSORIATIC ARTHRITIS) (H): ICD-10-CM

## 2022-07-01 RX ORDER — METHOTREXATE 2.5 MG/1
TABLET ORAL
Qty: 36 TABLET | Refills: 0 | Status: SHIPPED | OUTPATIENT
Start: 2022-07-01 | End: 2022-09-15

## 2022-07-09 DIAGNOSIS — L40.50 PSA (PSORIATIC ARTHRITIS) (H): ICD-10-CM

## 2022-07-11 RX ORDER — METHOTREXATE 2.5 MG/1
TABLET ORAL
Qty: 36 TABLET | Refills: 0 | OUTPATIENT
Start: 2022-07-11

## 2022-08-24 DIAGNOSIS — L40.50 PSA (PSORIATIC ARTHRITIS) (H): ICD-10-CM

## 2022-08-24 RX ORDER — METHOTREXATE 2.5 MG/1
TABLET ORAL
Qty: 36 TABLET | Refills: 0 | OUTPATIENT
Start: 2022-08-24

## 2022-09-12 ENCOUNTER — LAB (OUTPATIENT)
Dept: LAB | Facility: CLINIC | Age: 72
End: 2022-09-12
Payer: COMMERCIAL

## 2022-09-12 DIAGNOSIS — L40.50 PSA (PSORIATIC ARTHRITIS) (H): ICD-10-CM

## 2022-09-12 LAB
ALBUMIN SERPL BCG-MCNC: 4 G/DL (ref 3.5–5.2)
ALT SERPL W P-5'-P-CCNC: 18 U/L (ref 10–35)
CREAT SERPL-MCNC: 0.89 MG/DL (ref 0.51–0.95)
ERYTHROCYTE [DISTWIDTH] IN BLOOD BY AUTOMATED COUNT: 13.5 % (ref 10–15)
GFR SERPL CREATININE-BSD FRML MDRD: 68 ML/MIN/1.73M2
HCT VFR BLD AUTO: 36.8 % (ref 35–47)
HGB BLD-MCNC: 11.9 G/DL (ref 11.7–15.7)
MCH RBC QN AUTO: 32.2 PG (ref 26.5–33)
MCHC RBC AUTO-ENTMCNC: 32.3 G/DL (ref 31.5–36.5)
MCV RBC AUTO: 100 FL (ref 78–100)
PLATELET # BLD AUTO: 237 10E3/UL (ref 150–450)
RBC # BLD AUTO: 3.69 10E6/UL (ref 3.8–5.2)
WBC # BLD AUTO: 5.3 10E3/UL (ref 4–11)

## 2022-09-12 PROCEDURE — 82565 ASSAY OF CREATININE: CPT

## 2022-09-12 PROCEDURE — 82040 ASSAY OF SERUM ALBUMIN: CPT

## 2022-09-12 PROCEDURE — 85027 COMPLETE CBC AUTOMATED: CPT

## 2022-09-12 PROCEDURE — 36415 COLL VENOUS BLD VENIPUNCTURE: CPT

## 2022-09-12 PROCEDURE — 84460 ALANINE AMINO (ALT) (SGPT): CPT

## 2022-09-15 ENCOUNTER — OFFICE VISIT (OUTPATIENT)
Dept: RHEUMATOLOGY | Facility: CLINIC | Age: 72
End: 2022-09-15
Payer: COMMERCIAL

## 2022-09-15 VITALS
HEART RATE: 78 BPM | BODY MASS INDEX: 25.37 KG/M2 | SYSTOLIC BLOOD PRESSURE: 130 MMHG | DIASTOLIC BLOOD PRESSURE: 72 MMHG | WEIGHT: 143.2 LBS

## 2022-09-15 DIAGNOSIS — M19.042 LOCALIZED PRIMARY OSTEOARTHRITIS OF BOTH HANDS: ICD-10-CM

## 2022-09-15 DIAGNOSIS — M19.041 LOCALIZED PRIMARY OSTEOARTHRITIS OF BOTH HANDS: ICD-10-CM

## 2022-09-15 DIAGNOSIS — L40.50 PSA (PSORIATIC ARTHRITIS) (H): Primary | ICD-10-CM

## 2022-09-15 DIAGNOSIS — Z79.899 HIGH RISK MEDICATION USE: ICD-10-CM

## 2022-09-15 PROCEDURE — 99214 OFFICE O/P EST MOD 30 MIN: CPT | Performed by: INTERNAL MEDICINE

## 2022-09-15 RX ORDER — METHOTREXATE 2.5 MG/1
TABLET ORAL
Qty: 36 TABLET | Refills: 0 | Status: SHIPPED | OUTPATIENT
Start: 2022-09-15 | End: 2023-01-10

## 2022-09-15 NOTE — PROGRESS NOTES
Rheumatology follow-up visit note     Ceci is a 72 year old female presents today for follow-up.    Ceci was seen today for recheck.    Diagnoses and all orders for this visit:    PSA (psoriatic arthritis) (H)  -     methotrexate sodium 2.5 MG TABS; TAKE 3 TABLETS (7.5 MG) BY MOUTH ONCE A WEEK    Localized primary osteoarthritis of both hands    High risk medication use             Follow up in 6 months.    HPI    Ceci Garcia is a 72 year old female is here for follow-up of psoriatic arthritis, osteoarthritis on methotrexate 7.5 mg/week with folic acid.  She has experienced no flareup of her joints in this context.    In the interim she had a fall, had what appears to have been a fracture of one of the digits which was then lashay taped, that is resolved. Recent labs are within acceptable range. She is well aware of the management principles of OA.            DETAILED EXAMINATION  09/15/22  :    Vitals:    09/15/22 0933   BP: 130/72   Pulse: 78   Weight: 65 kg (143 lb 3.2 oz)     Alert oriented. Head including the face is examined for malar rash, heliotropes, scarring, lupus pernio. Eyes examined for redness such as in episcleritis/scleritis, periorbital lesions.   Neck/ Face examined for parotid gland swelling, range of motion of neck.  Left upper and lower and right upper and lower extremities examined for tenderness, swelling, warmth of the appendicular joints, range of motion, edema, rash.  Some of the important findings included: she does not have evidence of synovitis in the palpable joints of the upper extremities.  No significant deformities of the digits.  + [place] nodes.  Range of motion of the shoulders  show full abduction.  No JLT effusion or warmth of the knees.  she does not have dactylitis of the digits.     Patient Active Problem List    Diagnosis Date Noted     Closed compression fracture of body of L1 vertebra (H) 01/17/2021     Priority: Medium     Type 2 diabetes mellitus without  complication (H) 2021     Priority: Medium     Hyperlipidemia 2021     Priority: Medium     High risk medication use 2020     Priority: Medium     PSA (psoriatic arthritis) (H) 2019     Priority: Medium     Psoriasis 2019     Priority: Medium     Localized primary osteoarthritis of both hands 2019     Priority: Medium     Past Surgical History:   Procedure Laterality Date     BIOPSY BREAST       BREAST CYST ASPIRATION       BREAST CYST EXCISION        SECTION       ECTOPIC PREGNANCY SURGERY       KIDNEY SURGERY        Past Medical History:   Diagnosis Date     Diabetes mellitus (H)      Hyperlipidemia      Allergies   Allergen Reactions     Ciprofloxacin Anaphylaxis, Hives, Shortness Of Breath and Swelling     Oxycodone-Aspirin      Other reaction(s): breathing issues     Oxycodone-Acetaminophen Rash     Current Outpatient Medications   Medication Sig Dispense Refill     acetaminophen (TYLENOL) 325 MG tablet [ACETAMINOPHEN (TYLENOL) 325 MG TABLET] Take 325 mg by mouth every 6 (six) hours as needed for pain.       aspirin 81 MG EC tablet [ASPIRIN 81 MG EC TABLET] Take 81 mg by mouth daily.        b complex vitamins capsule [B COMPLEX VITAMINS CAPSULE] Take 1 capsule by mouth daily.        calcitonin, salmon, (MIACALCIN) 200 unit/actuation nasal spray [CALCITONIN, SALMON, (MIACALCIN) 200 UNIT/ACTUATION NASAL SPRAY] Apply 1 spray into alternating nostrils daily. 3.7 mL 0     CALCIUM ORAL [CALCIUM ORAL] Take 1 tablet by mouth daily.        cholecalciferol, vitamin D3, (VITAMIN D3) 5,000 unit Tab [CHOLECALCIFEROL, VITAMIN D3, (VITAMIN D3) 5,000 UNIT TAB] Take 5,000 Units by mouth daily.        citalopram (CELEXA) 20 MG tablet 1-2 tab(s)       CONTOUR NEXT STRIPS strips [CONTOUR NEXT STRIPS STRIPS] USE AS DIRECTED 2 TIMES A DAY  5     cyanocobalamin, vitamin B-12, (VITAMIN B-12 ORAL) [CYANOCOBALAMIN, VITAMIN B-12, (VITAMIN B-12 ORAL)] Take 5,000 mcg by mouth daily.        cyclobenzaprine (FLEXERIL) 10 MG tablet Take 10 mg by mouth       diazepam (VALIUM) 5 MG tablet Take 1 tablet (5 mg) by mouth every 6 hours as needed for muscle spasms or pain 20 tablet 0     estradiol (ESTRACE) 0.01 % (0.1 mg/gram) vaginal cream [ESTRADIOL (ESTRACE) 0.01 % (0.1 MG/GRAM) VAGINAL CREAM] Insert 1 g into the vagina every other day.        folic acid (FOLVITE) 1 MG tablet Take 1 tablet (1 mg) by mouth daily 90 tablet 0     glipiZIDE (GLUCOTROL) 10 MG 24 hr tablet [GLIPIZIDE (GLUCOTROL) 10 MG 24 HR TABLET] Take 10 mg by mouth daily.              HYDROcodone-acetaminophen (NORCO) 5-325 MG tablet Take 1 tablet by mouth every 6 hours as needed for severe pain       HYDROmorphone (DILAUDID) 2 MG tablet [HYDROMORPHONE (DILAUDID) 2 MG TABLET] Take 0.5-1 tablets (1-2 mg total) by mouth every 4 (four) hours as needed. 12 tablet 0     insulin glargine (LANTUS SOLOSTAR) 100 unit/mL (3 mL) pen [INSULIN GLARGINE (LANTUS SOLOSTAR) 100 UNIT/ML (3 ML) PEN] Inject 19 Units under the skin at bedtime.        lidocaine (XYLOCAINE) 5 % ointment [LIDOCAINE (XYLOCAINE) 5 % OINTMENT] Apply to posterior neck 2 gms 4 times/day 150 g 0     LORazepam (ATIVAN) 0.5 MG tablet [LORAZEPAM (ATIVAN) 0.5 MG TABLET] Take 0.5 mg by mouth at bedtime.        metFORMIN (GLUCOPHAGE-XR) 500 MG 24 hr tablet [METFORMIN (GLUCOPHAGE-XR) 500 MG 24 HR TABLET] Take 500 mg by mouth daily with breakfast.        methotrexate sodium 2.5 MG TABS TAKE 3 TABLETS (7.5 MG) BY MOUTH ONCE A WEEK 36 tablet 0     ondansetron (ZOFRAN-ODT) 8 MG ODT tab Take 1 tablet (8 mg) by mouth every 8 hours as needed for nausea 12 tablet 0     simvastatin (ZOCOR) 20 MG tablet [SIMVASTATIN (ZOCOR) 20 MG TABLET] Take 20 mg by mouth at bedtime.              metFORMIN (GLUCOPHAGE-XR) 500 MG 24 hr tablet [METFORMIN (GLUCOPHAGE-XR) 500 MG 24 HR TABLET] Take 1,000 mg by mouth daily with supper. (Patient not taking: Reported on 9/15/2022)       methylPREDNISolone (MEDROL DOSEPAK) 4 MG  tablet therapy pack Take 4 mg by mouth See Admin Instructions Follow Package Directions (Patient not taking: Reported on 9/15/2022)       PREDNISONE PO Taper to off (Patient not taking: No sig reported)       family history includes Dementia in her mother; Heart Disease in her father; Hypertension in her mother; No Known Problems in her daughter.  Social Connections: Not on file          WBC Count   Date Value Ref Range Status   09/12/2022 5.3 4.0 - 11.0 10e3/uL Final     RBC Count   Date Value Ref Range Status   09/12/2022 3.69 (L) 3.80 - 5.20 10e6/uL Final     Hemoglobin   Date Value Ref Range Status   09/12/2022 11.9 11.7 - 15.7 g/dL Final     Hematocrit   Date Value Ref Range Status   09/12/2022 36.8 35.0 - 47.0 % Final     MCV   Date Value Ref Range Status   09/12/2022 100 78 - 100 fL Final     MCH   Date Value Ref Range Status   09/12/2022 32.2 26.5 - 33.0 pg Final     Platelet Count   Date Value Ref Range Status   09/12/2022 237 150 - 450 10e3/uL Final     % Lymphocytes   Date Value Ref Range Status   06/11/2019 16 (L) 20 - 40 % Final     AST   Date Value Ref Range Status   09/20/2021 16 0 - 40 U/L Final     ALT   Date Value Ref Range Status   09/12/2022 18 10 - 35 U/L Final     Albumin   Date Value Ref Range Status   09/12/2022 4.0 3.5 - 5.2 g/dL Final   06/15/2022 3.7 3.5 - 5.0 g/dL Final     Alkaline Phosphatase   Date Value Ref Range Status   09/20/2021 50 45 - 120 U/L Final     Creatinine   Date Value Ref Range Status   09/12/2022 0.89 0.51 - 0.95 mg/dL Final     GFR Estimate   Date Value Ref Range Status   09/12/2022 68 >60 mL/min/1.73m2 Final     Comment:     Effective December 21, 2021 eGFRcr in adults is calculated using the 2021 CKD-EPI creatinine equation which includes age and gender (Jayda coleman al., NEJM, DOI: 10.1056/FJIEoi3008798)   06/11/2021 >60 >60 mL/min/1.73m2 Final     GFR Estimate If Black   Date Value Ref Range Status   06/11/2021 >60 >60 mL/min/1.73m2 Final     Erythrocyte Sedimentation  Rate   Date Value Ref Range Status   11/08/2018 12 0 - 20 mm/hr Final     CRP   Date Value Ref Range Status   11/08/2018 1.1 (H) 0.0 - 0.8 mg/dL Final

## 2022-09-23 DIAGNOSIS — L40.50 PSA (PSORIATIC ARTHRITIS) (H): ICD-10-CM

## 2022-09-23 RX ORDER — METHOTREXATE 2.5 MG/1
TABLET ORAL
Qty: 36 TABLET | Refills: 0 | OUTPATIENT
Start: 2022-09-23

## 2022-10-25 ENCOUNTER — LAB REQUISITION (OUTPATIENT)
Dept: LAB | Facility: CLINIC | Age: 72
End: 2022-10-25

## 2022-10-25 PROCEDURE — 80053 COMPREHEN METABOLIC PANEL: CPT | Performed by: FAMILY MEDICINE

## 2022-10-25 PROCEDURE — 80061 LIPID PANEL: CPT | Performed by: FAMILY MEDICINE

## 2022-10-26 LAB
ALBUMIN SERPL BCG-MCNC: 4.2 G/DL (ref 3.5–5.2)
ALP SERPL-CCNC: 58 U/L (ref 35–104)
ALT SERPL W P-5'-P-CCNC: 18 U/L (ref 10–35)
ANION GAP SERPL CALCULATED.3IONS-SCNC: 13 MMOL/L (ref 7–15)
AST SERPL W P-5'-P-CCNC: 21 U/L (ref 10–35)
BILIRUB SERPL-MCNC: 0.4 MG/DL
BUN SERPL-MCNC: 11.9 MG/DL (ref 8–23)
CALCIUM SERPL-MCNC: 9.9 MG/DL (ref 8.8–10.2)
CHLORIDE SERPL-SCNC: 102 MMOL/L (ref 98–107)
CHOLEST SERPL-MCNC: 201 MG/DL
CREAT SERPL-MCNC: 0.9 MG/DL (ref 0.51–0.95)
DEPRECATED HCO3 PLAS-SCNC: 24 MMOL/L (ref 22–29)
GFR SERPL CREATININE-BSD FRML MDRD: 67 ML/MIN/1.73M2
GLUCOSE SERPL-MCNC: 117 MG/DL (ref 70–99)
HDLC SERPL-MCNC: 62 MG/DL
LDLC SERPL CALC-MCNC: 107 MG/DL
NONHDLC SERPL-MCNC: 139 MG/DL
POTASSIUM SERPL-SCNC: 4.7 MMOL/L (ref 3.4–5.3)
PROT SERPL-MCNC: 6.4 G/DL (ref 6.4–8.3)
SODIUM SERPL-SCNC: 139 MMOL/L (ref 136–145)
TRIGL SERPL-MCNC: 160 MG/DL

## 2022-12-14 ENCOUNTER — LAB (OUTPATIENT)
Dept: LAB | Facility: CLINIC | Age: 72
End: 2022-12-14
Payer: COMMERCIAL

## 2022-12-14 DIAGNOSIS — L40.50 PSA (PSORIATIC ARTHRITIS) (H): ICD-10-CM

## 2022-12-14 LAB
ALBUMIN SERPL BCG-MCNC: 4.1 G/DL (ref 3.5–5.2)
ALT SERPL W P-5'-P-CCNC: 9 U/L (ref 10–35)
CREAT SERPL-MCNC: 1.02 MG/DL (ref 0.51–0.95)
ERYTHROCYTE [DISTWIDTH] IN BLOOD BY AUTOMATED COUNT: 13.4 % (ref 10–15)
GFR SERPL CREATININE-BSD FRML MDRD: 57 ML/MIN/1.73M2
HCT VFR BLD AUTO: 36.1 % (ref 35–47)
HGB BLD-MCNC: 11.8 G/DL (ref 11.7–15.7)
MCH RBC QN AUTO: 32.8 PG (ref 26.5–33)
MCHC RBC AUTO-ENTMCNC: 32.7 G/DL (ref 31.5–36.5)
MCV RBC AUTO: 100 FL (ref 78–100)
PLATELET # BLD AUTO: 256 10E3/UL (ref 150–450)
RBC # BLD AUTO: 3.6 10E6/UL (ref 3.8–5.2)
WBC # BLD AUTO: 6.6 10E3/UL (ref 4–11)

## 2022-12-14 PROCEDURE — 82040 ASSAY OF SERUM ALBUMIN: CPT

## 2022-12-14 PROCEDURE — 85027 COMPLETE CBC AUTOMATED: CPT

## 2022-12-14 PROCEDURE — 82565 ASSAY OF CREATININE: CPT

## 2022-12-14 PROCEDURE — 84460 ALANINE AMINO (ALT) (SGPT): CPT

## 2022-12-14 PROCEDURE — 36415 COLL VENOUS BLD VENIPUNCTURE: CPT

## 2023-01-10 DIAGNOSIS — L40.50 PSA (PSORIATIC ARTHRITIS) (H): ICD-10-CM

## 2023-01-10 RX ORDER — METHOTREXATE 2.5 MG/1
TABLET ORAL
Qty: 36 TABLET | Refills: 0 | Status: SHIPPED | OUTPATIENT
Start: 2023-01-10 | End: 2023-03-15

## 2023-01-10 NOTE — TELEPHONE ENCOUNTER
Last labs 12/14/22  ALT low at 9  RBC low ( stable)  Creatinine elevated at 1.02 ( up from the last at 0.90).  Last OV 9/2022  Next ov 3/2023  Dx:PSA  Please advise on methotrexate refill 3 tabs weekly.

## 2023-02-09 ENCOUNTER — TELEPHONE (OUTPATIENT)
Dept: RHEUMATOLOGY | Facility: CLINIC | Age: 73
End: 2023-02-09
Payer: COMMERCIAL

## 2023-02-09 NOTE — TELEPHONE ENCOUNTER
BRITTA Kohli from [facility] would like to speak to Dr. Puente re: new melanoma dx. Please call back: [phone] option 4.

## 2023-02-09 NOTE — TELEPHONE ENCOUNTER
LOV:9/15/22  Diagnoses and all orders for this visit:    PSA (psoriatic arthritis) (H)  -     methotrexate sodium 2.5 MG TABS; TAKE 3 TABLETS (7.5 MG) BY MOUTH ONCE A WEEK  Localized primary osteoarthritis of both hands  High risk medication use     HPI     Ceci Garcia is a 72 year old female is here for follow-up of psoriatic arthritis, osteoarthritis on methotrexate 7.5 mg/week with folic acid.  She has experienced no flareup of her joints in this context.    In the interim she had a fall, had what appears to have been a fracture of one of the digits which was then lashay taped, that is resolved. Recent labs are within acceptable range. She is well aware of the management principles of OA.

## 2023-02-09 NOTE — TELEPHONE ENCOUNTER
Informed provider and connected with Kamilla from [facility].     Need to discuss medication options.     Informed pt and pt verbalized understanding

## 2023-03-10 ENCOUNTER — LAB (OUTPATIENT)
Dept: LAB | Facility: CLINIC | Age: 73
End: 2023-03-10
Payer: COMMERCIAL

## 2023-03-10 DIAGNOSIS — L40.50 PSA (PSORIATIC ARTHRITIS) (H): ICD-10-CM

## 2023-03-10 LAB
ALBUMIN SERPL BCG-MCNC: 4 G/DL (ref 3.5–5.2)
ALT SERPL W P-5'-P-CCNC: 11 U/L (ref 10–35)
CREAT SERPL-MCNC: 0.87 MG/DL (ref 0.51–0.95)
ERYTHROCYTE [DISTWIDTH] IN BLOOD BY AUTOMATED COUNT: 13.9 % (ref 10–15)
GFR SERPL CREATININE-BSD FRML MDRD: 70 ML/MIN/1.73M2
HCT VFR BLD AUTO: 36 % (ref 35–47)
HGB BLD-MCNC: 12 G/DL (ref 11.7–15.7)
MCH RBC QN AUTO: 33.1 PG (ref 26.5–33)
MCHC RBC AUTO-ENTMCNC: 33.3 G/DL (ref 31.5–36.5)
MCV RBC AUTO: 99 FL (ref 78–100)
PLATELET # BLD AUTO: 223 10E3/UL (ref 150–450)
RBC # BLD AUTO: 3.63 10E6/UL (ref 3.8–5.2)
WBC # BLD AUTO: 5.1 10E3/UL (ref 4–11)

## 2023-03-10 PROCEDURE — 82040 ASSAY OF SERUM ALBUMIN: CPT

## 2023-03-10 PROCEDURE — 36415 COLL VENOUS BLD VENIPUNCTURE: CPT

## 2023-03-10 PROCEDURE — 85027 COMPLETE CBC AUTOMATED: CPT

## 2023-03-10 PROCEDURE — 84460 ALANINE AMINO (ALT) (SGPT): CPT

## 2023-03-10 PROCEDURE — 82565 ASSAY OF CREATININE: CPT

## 2023-03-15 ENCOUNTER — TELEPHONE (OUTPATIENT)
Dept: RHEUMATOLOGY | Facility: CLINIC | Age: 73
End: 2023-03-15

## 2023-03-15 ENCOUNTER — OFFICE VISIT (OUTPATIENT)
Dept: RHEUMATOLOGY | Facility: CLINIC | Age: 73
End: 2023-03-15
Payer: COMMERCIAL

## 2023-03-15 VITALS
SYSTOLIC BLOOD PRESSURE: 132 MMHG | HEART RATE: 84 BPM | WEIGHT: 141.8 LBS | DIASTOLIC BLOOD PRESSURE: 88 MMHG | BODY MASS INDEX: 25.12 KG/M2 | HEIGHT: 63 IN

## 2023-03-15 DIAGNOSIS — Z79.899 HIGH RISK MEDICATION USE: ICD-10-CM

## 2023-03-15 DIAGNOSIS — C43.71 MALIGNANT MELANOMA OF RIGHT LOWER EXTREMITY (H): ICD-10-CM

## 2023-03-15 DIAGNOSIS — L40.50 PSA (PSORIATIC ARTHRITIS) (H): Primary | ICD-10-CM

## 2023-03-15 DIAGNOSIS — L40.9 PSORIASIS: ICD-10-CM

## 2023-03-15 DIAGNOSIS — M19.042 LOCALIZED PRIMARY OSTEOARTHRITIS OF BOTH HANDS: ICD-10-CM

## 2023-03-15 DIAGNOSIS — M19.041 LOCALIZED PRIMARY OSTEOARTHRITIS OF BOTH HANDS: ICD-10-CM

## 2023-03-15 PROCEDURE — 99214 OFFICE O/P EST MOD 30 MIN: CPT | Performed by: INTERNAL MEDICINE

## 2023-03-15 NOTE — PROGRESS NOTES
Rheumatology follow-up visit note     Ceci is a 72 year old female presents today for follow-up.    Ceci was seen today for recheck.    Diagnoses and all orders for this visit:    PSA (psoriatic arthritis) (H)  -     apremilast (OTEZLA) 30 MG tablet; Take 1 tablet (30 mg) by mouth 2 times daily Hold for signs of infection, and seek medical attention.  -     Apremilast (OTEZLA) 10 & 20 & 30 MG TBPK; Day 1: 10 mg in morning Day 2: 10 mg in morning and 10 mg in evening Day 3: 10 mg in morning and 20 mg in evening Day 4: 20 mg in morning and 20 mg in evening Day 5: 20 mg in morning and 30 mg in evening Day 6 and thereafter: 30 mg twice daily    Localized primary osteoarthritis of both hands    High risk medication use    Psoriasis    Malignant melanoma of right lower extremity (H)  -     apremilast (OTEZLA) 30 MG tablet; Take 1 tablet (30 mg) by mouth 2 times daily Hold for signs of infection, and seek medical attention.  -     Apremilast (OTEZLA) 10 & 20 & 30 MG TBPK; Day 1: 10 mg in morning Day 2: 10 mg in morning and 10 mg in evening Day 3: 10 mg in morning and 20 mg in evening Day 4: 20 mg in morning and 20 mg in evening Day 5: 20 mg in morning and 30 mg in evening Day 6 and thereafter: 30 mg twice daily        While her psoriatic arthritis has remained under good control with methotrexate recently been found to have malignant melanoma, discontinue methotrexate, will go for Otezla literature on this is provided.  We will meet here soon at this time in 3 months.    Follow up in 3 months.    HPI    Ceci Garcia is a 72 year old female is here for follow-up of  psoriatic arthritis, osteoarthritis on methotrexate 7.5 mg/week with folic acid.  She has experienced no flareup of her joints in this context.    Recently she was found to have a lesion on her right thigh.  She was seen in dermatology.  She had a biopsy turned out to be melanoma and then she had repeat procedure done.  The question of whether she should  "stay on methotrexate came.   . Recent labs are within acceptable range. She is well aware of the management principles of OA.      DETAILED EXAMINATION  03/15/23  :    Vitals:    03/15/23 1030   BP: 132/88   BP Location: Right arm   Patient Position: Sitting   Cuff Size: Adult Regular   Pulse: 84   Weight: 64.3 kg (141 lb 12.8 oz)   Height: 1.6 m (5' 3\")     Alert oriented. Head including the face is examined for malar rash, heliotropes, scarring, lupus pernio. Eyes examined for redness such as in episcleritis/scleritis, periorbital lesions.   Neck/ Face examined for parotid gland swelling, range of motion of neck.  Left upper and lower and right upper and lower extremities examined for tenderness, swelling, warmth of the appendicular joints, range of motion, edema, rash.  Some of the important findings included: she does not have evidence of synovitis in the palpable joints of the upper extremities.  No significant deformities of the digits.  + [place] nodes.  Range of motion of the shoulders  show full abduction.  No JLT effusion or warmth of the knees.  she does not have dactylitis of the digits.     Patient Active Problem List    Diagnosis Date Noted     Closed compression fracture of body of L1 vertebra (H) 2021     Priority: Medium     Type 2 diabetes mellitus without complication (H) 2021     Priority: Medium     Hyperlipidemia 2021     Priority: Medium     High risk medication use 2020     Priority: Medium     PSA (psoriatic arthritis) (H) 2019     Priority: Medium     Psoriasis 2019     Priority: Medium     Localized primary osteoarthritis of both hands 2019     Priority: Medium     Past Surgical History:   Procedure Laterality Date     BIOPSY BREAST       BREAST CYST ASPIRATION       BREAST CYST EXCISION        SECTION       ECTOPIC PREGNANCY SURGERY       KIDNEY SURGERY        Past Medical History:   Diagnosis Date     Diabetes mellitus (H)      " Hyperlipidemia      Allergies   Allergen Reactions     Ciprofloxacin Anaphylaxis, Hives, Shortness Of Breath and Swelling     Oxycodone-Aspirin      Other reaction(s): breathing issues     Oxycodone-Acetaminophen Rash     Current Outpatient Medications   Medication Sig Dispense Refill     acetaminophen (TYLENOL) 325 MG tablet [ACETAMINOPHEN (TYLENOL) 325 MG TABLET] Take 325 mg by mouth every 6 (six) hours as needed for pain.       aspirin 81 MG EC tablet [ASPIRIN 81 MG EC TABLET] Take 81 mg by mouth daily.        b complex vitamins capsule [B COMPLEX VITAMINS CAPSULE] Take 1 capsule by mouth daily.        calcitonin, salmon, (MIACALCIN) 200 unit/actuation nasal spray [CALCITONIN, SALMON, (MIACALCIN) 200 UNIT/ACTUATION NASAL SPRAY] Apply 1 spray into alternating nostrils daily. 3.7 mL 0     CALCIUM ORAL [CALCIUM ORAL] Take 1 tablet by mouth daily.        cholecalciferol, vitamin D3, (VITAMIN D3) 5,000 unit Tab [CHOLECALCIFEROL, VITAMIN D3, (VITAMIN D3) 5,000 UNIT TAB] Take 5,000 Units by mouth daily.        citalopram (CELEXA) 20 MG tablet 1-2 tab(s)       CONTOUR NEXT STRIPS strips [CONTOUR NEXT STRIPS STRIPS] USE AS DIRECTED 2 TIMES A DAY  5     cyanocobalamin, vitamin B-12, (VITAMIN B-12 ORAL) [CYANOCOBALAMIN, VITAMIN B-12, (VITAMIN B-12 ORAL)] Take 5,000 mcg by mouth daily.       cyclobenzaprine (FLEXERIL) 10 MG tablet Take 10 mg by mouth       diazepam (VALIUM) 5 MG tablet Take 1 tablet (5 mg) by mouth every 6 hours as needed for muscle spasms or pain 20 tablet 0     estradiol (ESTRACE) 0.01 % (0.1 mg/gram) vaginal cream [ESTRADIOL (ESTRACE) 0.01 % (0.1 MG/GRAM) VAGINAL CREAM] Insert 1 g into the vagina every other day.        folic acid (FOLVITE) 1 MG tablet Take 1 tablet (1 mg) by mouth daily 90 tablet 0     glipiZIDE (GLUCOTROL) 10 MG 24 hr tablet [GLIPIZIDE (GLUCOTROL) 10 MG 24 HR TABLET] Take 10 mg by mouth daily.              HYDROcodone-acetaminophen (NORCO) 5-325 MG tablet Take 1 tablet by mouth every 6  hours as needed for severe pain       HYDROmorphone (DILAUDID) 2 MG tablet [HYDROMORPHONE (DILAUDID) 2 MG TABLET] Take 0.5-1 tablets (1-2 mg total) by mouth every 4 (four) hours as needed. 12 tablet 0     insulin glargine (LANTUS SOLOSTAR) 100 unit/mL (3 mL) pen [INSULIN GLARGINE (LANTUS SOLOSTAR) 100 UNIT/ML (3 ML) PEN] Inject 19 Units under the skin at bedtime.        lidocaine (XYLOCAINE) 5 % ointment [LIDOCAINE (XYLOCAINE) 5 % OINTMENT] Apply to posterior neck 2 gms 4 times/day 150 g 0     LORazepam (ATIVAN) 0.5 MG tablet [LORAZEPAM (ATIVAN) 0.5 MG TABLET] Take 0.5 mg by mouth at bedtime.        metFORMIN (GLUCOPHAGE-XR) 500 MG 24 hr tablet [METFORMIN (GLUCOPHAGE-XR) 500 MG 24 HR TABLET] Take 1,000 mg by mouth daily with supper. (Patient not taking: Reported on 9/15/2022)       metFORMIN (GLUCOPHAGE-XR) 500 MG 24 hr tablet [METFORMIN (GLUCOPHAGE-XR) 500 MG 24 HR TABLET] Take 500 mg by mouth daily with breakfast.        methotrexate sodium 2.5 MG TABS TAKE 3 TABLETS (7.5 MG) BY MOUTH ONCE A WEEK 36 tablet 0     methylPREDNISolone (MEDROL DOSEPAK) 4 MG tablet therapy pack Take 4 mg by mouth See Admin Instructions Follow Package Directions (Patient not taking: Reported on 9/15/2022)       ondansetron (ZOFRAN-ODT) 8 MG ODT tab Take 1 tablet (8 mg) by mouth every 8 hours as needed for nausea 12 tablet 0     PREDNISONE PO Taper to off (Patient not taking: Reported on 3/15/2022)       simvastatin (ZOCOR) 20 MG tablet [SIMVASTATIN (ZOCOR) 20 MG TABLET] Take 20 mg by mouth at bedtime.              family history includes Dementia in her mother; Heart Disease in her father; Hypertension in her mother; No Known Problems in her daughter.  Social Connections: Not on file          WBC Count   Date Value Ref Range Status   03/10/2023 5.1 4.0 - 11.0 10e3/uL Final     RBC Count   Date Value Ref Range Status   03/10/2023 3.63 (L) 3.80 - 5.20 10e6/uL Final     Hemoglobin   Date Value Ref Range Status   03/10/2023 12.0 11.7 - 15.7  g/dL Final     Hematocrit   Date Value Ref Range Status   03/10/2023 36.0 35.0 - 47.0 % Final     MCV   Date Value Ref Range Status   03/10/2023 99 78 - 100 fL Final     MCH   Date Value Ref Range Status   03/10/2023 33.1 (H) 26.5 - 33.0 pg Final     Platelet Count   Date Value Ref Range Status   03/10/2023 223 150 - 450 10e3/uL Final     % Lymphocytes   Date Value Ref Range Status   06/11/2019 16 (L) 20 - 40 % Final     AST   Date Value Ref Range Status   10/25/2022 21 10 - 35 U/L Final     ALT   Date Value Ref Range Status   03/10/2023 11 10 - 35 U/L Final     Albumin   Date Value Ref Range Status   03/10/2023 4.0 3.5 - 5.2 g/dL Final   06/15/2022 3.7 3.5 - 5.0 g/dL Final     Alkaline Phosphatase   Date Value Ref Range Status   10/25/2022 58 35 - 104 U/L Final     Creatinine   Date Value Ref Range Status   03/10/2023 0.87 0.51 - 0.95 mg/dL Final     GFR Estimate   Date Value Ref Range Status   03/10/2023 70 >60 mL/min/1.73m2 Final     Comment:     eGFR calculated using 2021 CKD-EPI equation.   06/11/2021 >60 >60 mL/min/1.73m2 Final     GFR Estimate If Black   Date Value Ref Range Status   06/11/2021 >60 >60 mL/min/1.73m2 Final     Erythrocyte Sedimentation Rate   Date Value Ref Range Status   11/08/2018 12 0 - 20 mm/hr Final     CRP   Date Value Ref Range Status   11/08/2018 1.1 (H) 0.0 - 0.8 mg/dL Final

## 2023-03-15 NOTE — TELEPHONE ENCOUNTER
Prior Authorization Approval    Authorization Effective Date: 2/13/2023  Authorization Expiration Date: 12/31/2023  Medication: Otezla PA - Approved  Approved Dose/Quantity: 55 tabs per 28 days  Reference #: [id number]   Insurance Company: [employer] - Phone [phone] Fax [phone]  Expected CoPay: $1,464.33     CoPay Card Available:      Foundation Assistance Needed:    Which Pharmacy is filling the prescription (Not needed for infusion/clinic administered): [facility] - [place], [place]  [address]   Pharmacy Notified: Yes  Patient Notified: Yes    Contacting patient regarding free drug.

## 2023-03-15 NOTE — TELEPHONE ENCOUNTER
Spoke with patient regarding free drug, she is close on income limit but would like to try due to all similar medications being expensive and will cost about the same. Emailed patient form to [email or web address] and sent provider portion to [email or web address].

## 2023-03-23 NOTE — TELEPHONE ENCOUNTER
Free Drug Application Initiated  Medication:  OTEZLA starter and maintenance   Sponsor: [employer]   Phone #: [phone]  Fax #: [phone]  Additional Information:     Faxed both MD section and pt section to DELORES Funes, [facility]  Specialty Pharmacy Clinic Liaison     [facility] Specialty    [email or web address]     Phone: [phone]  Fax: [phone]

## 2023-04-03 NOTE — TELEPHONE ENCOUNTER
Date 04/03/2023      Spoke to [employer] they are requesting the PA approval    Faxed to them at [phone]      DELORES Guerrero, [facility]  Specialty Pharmacy Clinic Liaison     [facility] Specialty    [email or web address]     Phone: [phone]  Fax: [phone]

## 2023-04-10 NOTE — TELEPHONE ENCOUNTER
Free Drug Application Denied  Medication:  OTEZLA  Denial Reason(s): Patient is over income level for assistance   Patient notified? Yes   Additional Information:     Writer spoke to patient regarding denial, and also discussed what her cost would be at a pharmacy to get the otezla.    She would like to hold off at this time. It is too much for her. She wants to let Cindi and team know that she would like to consider going off the methotrexate as well and see how things go.    Could someone please follow up with her?    Thank you    DELORES Guerrero, [facility]  Specialty Pharmacy Clinic Liaison     [facility] Specialty    [email or web address]     Phone: [phone]  Fax: [phone]

## 2023-04-11 NOTE — TELEPHONE ENCOUNTER
Per provider pt should stop methotrexate still. If pt would like to try to staying off all medications up to pt. Remind patient of possible flare ups. Pt understands and will contact clinic if flare up occurs.

## 2023-04-11 NOTE — TELEPHONE ENCOUNTER
LOV 3/15/23  FOV 6/15/23  Dx:PSA (psoriatic arthritis) (H)  While her psoriatic arthritis has remained under good control with methotrexate recently been found to have malignant melanoma, discontinue methotrexate, will go for Otezla literature on this is provided.  We will meet here soon at this time in 3 months.

## 2023-04-25 DIAGNOSIS — L40.50 PSA (PSORIATIC ARTHRITIS) (H): ICD-10-CM

## 2023-04-25 DIAGNOSIS — C43.71 MALIGNANT MELANOMA OF RIGHT LOWER EXTREMITY (H): ICD-10-CM

## 2023-04-25 NOTE — TELEPHONE ENCOUNTER
Resent rx to [facility] for Otezla starter pack and maintenance doseper Dr Puente's note  Refilled per Rheum RN refill protocol

## 2023-06-15 ENCOUNTER — OFFICE VISIT (OUTPATIENT)
Dept: RHEUMATOLOGY | Facility: CLINIC | Age: 73
End: 2023-06-15
Payer: COMMERCIAL

## 2023-06-15 VITALS
BODY MASS INDEX: 25.74 KG/M2 | HEART RATE: 80 BPM | SYSTOLIC BLOOD PRESSURE: 120 MMHG | DIASTOLIC BLOOD PRESSURE: 70 MMHG | WEIGHT: 145.3 LBS

## 2023-06-15 DIAGNOSIS — C43.71 MALIGNANT MELANOMA OF RIGHT LOWER EXTREMITY (H): ICD-10-CM

## 2023-06-15 DIAGNOSIS — L40.50 PSA (PSORIATIC ARTHRITIS) (H): Primary | ICD-10-CM

## 2023-06-15 DIAGNOSIS — L40.9 PSORIASIS: ICD-10-CM

## 2023-06-15 PROCEDURE — 99214 OFFICE O/P EST MOD 30 MIN: CPT | Performed by: INTERNAL MEDICINE

## 2023-06-15 NOTE — PROGRESS NOTES
Rheumatology follow-up visit note     Ceci is a 72 year old female presents today for follow-up.    Ceci was seen today for recheck.    Diagnoses and all orders for this visit:    PSA (psoriatic arthritis) (H)    Malignant melanoma of right lower extremity (H)    Psoriasis        This patient with psoriatic arthritis, Psoriasis is no longer on methotrexate could not get Otezla for reasons noted.  She takes Tylenol perhaps 2 times a week for joint symptoms.  There are various possibilities going forward.  It may be that she is in remission hopefully for an extended time as well as PSA is concern or that in near future she would experience a flareup of PSA.  I have discussed with her the options.  Leflunomide literature is provided.  She was concerned that it also is an immunosuppression.  We discussed that how other than the Otezla almost all other options would be considered immunosuppressants given that the underlying issue is immune i.e. psoriatic arthritis and psoriasis.  She is going to think through this.  We will meet her in the next 3 to 4 months or sooner.    Follow up in 4 months.    HPI    Ceci Garcia is a 72 year old female is here for follow-up.  On her previous visit the methotrexate was discontinued in view of the discovery of melanoma.  Otezla was started.  This was denied by her insurance/very high co-pay and she did not qualify for the company based availability.  Since then she has noted to have some stiffness in the joints but no recurrence of original symptoms overall she feels that being off and on methotrexate has not made a substantial difference in her joint symptoms.  She noted pain level to be at 0.5/10.  She takes Tylenol a couple times a week for her joint symptoms.  The situation with the skin is different and that has gotten worse she is getting more symptoms in her hands the skin is peeling, and cracking on her fingers.  She is due to be seen in dermatology.      DETAILED  EXAMINATION  06/15/23  :    Vitals:    06/15/23 0854   BP: 120/70   Pulse: 80   Weight: 65.9 kg (145 lb 4.8 oz)     Alert oriented. Head including the face is examined for malar rash, heliotropes, scarring, lupus pernio. Eyes examined for redness such as in episcleritis/scleritis, periorbital lesions.   Neck/ Face examined for parotid gland swelling, range of motion of neck.  Left upper and lower and right upper and lower extremities examined for tenderness, swelling, warmth of the appendicular joints, range of motion, edema, rash.  Some of the important findings included: she does not have evidence of synovitis in the palpable joints of the upper extremities.  No significant deformities of the digits.  There is no dactylitis.  Patient Active Problem List    Diagnosis Date Noted     Closed compression fracture of body of L1 vertebra (H) 2021     Priority: Medium     Type 2 diabetes mellitus without complication (H) 2021     Priority: Medium     Hyperlipidemia 2021     Priority: Medium     High risk medication use 2020     Priority: Medium     PSA (psoriatic arthritis) (H) 2019     Priority: Medium     Psoriasis 2019     Priority: Medium     Localized primary osteoarthritis of both hands 2019     Priority: Medium     Past Surgical History:   Procedure Laterality Date     BIOPSY BREAST       BREAST CYST ASPIRATION       BREAST CYST EXCISION        SECTION       ECTOPIC PREGNANCY SURGERY       KIDNEY SURGERY        Past Medical History:   Diagnosis Date     Diabetes mellitus (H)      Hyperlipidemia      Allergies   Allergen Reactions     Ciprofloxacin Anaphylaxis, Hives, Shortness Of Breath and Swelling     Oxycodone-Aspirin      Other reaction(s): breathing issues     Oxycodone-Acetaminophen Rash     Current Outpatient Medications   Medication Sig Dispense Refill     acetaminophen (TYLENOL) 325 MG tablet [ACETAMINOPHEN (TYLENOL) 325 MG TABLET] Take 325 mg by mouth  every 6 (six) hours as needed for pain.       aspirin 81 MG EC tablet [ASPIRIN 81 MG EC TABLET] Take 81 mg by mouth daily.        b complex vitamins capsule [B COMPLEX VITAMINS CAPSULE] Take 1 capsule by mouth daily.        calcitonin, salmon, (MIACALCIN) 200 unit/actuation nasal spray [CALCITONIN, SALMON, (MIACALCIN) 200 UNIT/ACTUATION NASAL SPRAY] Apply 1 spray into alternating nostrils daily. 3.7 mL 0     CALCIUM ORAL [CALCIUM ORAL] Take 1 tablet by mouth daily.        cholecalciferol, vitamin D3, (VITAMIN D3) 5,000 unit Tab [CHOLECALCIFEROL, VITAMIN D3, (VITAMIN D3) 5,000 UNIT TAB] Take 5,000 Units by mouth daily.        citalopram (CELEXA) 20 MG tablet 1-2 tab(s)       CONTOUR NEXT STRIPS strips [CONTOUR NEXT STRIPS STRIPS] USE AS DIRECTED 2 TIMES A DAY  5     cyanocobalamin, vitamin B-12, (VITAMIN B-12 ORAL) [CYANOCOBALAMIN, VITAMIN B-12, (VITAMIN B-12 ORAL)] Take 5,000 mcg by mouth daily.       cyclobenzaprine (FLEXERIL) 10 MG tablet Take 10 mg by mouth       diazepam (VALIUM) 5 MG tablet Take 1 tablet (5 mg) by mouth every 6 hours as needed for muscle spasms or pain 20 tablet 0     estradiol (ESTRACE) 0.01 % (0.1 mg/gram) vaginal cream [ESTRADIOL (ESTRACE) 0.01 % (0.1 MG/GRAM) VAGINAL CREAM] Insert 1 g into the vagina every other day.        glipiZIDE (GLUCOTROL) 10 MG 24 hr tablet [GLIPIZIDE (GLUCOTROL) 10 MG 24 HR TABLET] Take 10 mg by mouth daily.              HYDROcodone-acetaminophen (NORCO) 5-325 MG tablet Take 1 tablet by mouth every 6 hours as needed for severe pain       HYDROmorphone (DILAUDID) 2 MG tablet [HYDROMORPHONE (DILAUDID) 2 MG TABLET] Take 0.5-1 tablets (1-2 mg total) by mouth every 4 (four) hours as needed. 12 tablet 0     insulin glargine (LANTUS SOLOSTAR) 100 unit/mL (3 mL) pen [INSULIN GLARGINE (LANTUS SOLOSTAR) 100 UNIT/ML (3 ML) PEN] Inject 19 Units under the skin at bedtime.        lidocaine (XYLOCAINE) 5 % ointment [LIDOCAINE (XYLOCAINE) 5 % OINTMENT] Apply to posterior neck 2 gms  4 times/day 150 g 0     LORazepam (ATIVAN) 0.5 MG tablet [LORAZEPAM (ATIVAN) 0.5 MG TABLET] Take 0.5 mg by mouth at bedtime.        metFORMIN (GLUCOPHAGE-XR) 500 MG 24 hr tablet [METFORMIN (GLUCOPHAGE-XR) 500 MG 24 HR TABLET] Take 500 mg by mouth daily with breakfast.        ondansetron (ZOFRAN-ODT) 8 MG ODT tab Take 1 tablet (8 mg) by mouth every 8 hours as needed for nausea 12 tablet 0     simvastatin (ZOCOR) 20 MG tablet [SIMVASTATIN (ZOCOR) 20 MG TABLET] Take 20 mg by mouth at bedtime.              Apremilast (OTEZLA) 10 & 20 & 30 MG TBPK Day 1: 10 mg in morning Day 2: 10 mg in morning and 10 mg in evening Day 3: 10 mg in morning and 20 mg in evening Day 4: 20 mg in morning and 20 mg in evening Day 5: 20 mg in morning and 30 mg in evening Day 6 and thereafter: 30 mg twice daily (Patient not taking: Reported on 6/15/2023) 1 each 0     apremilast (OTEZLA) 30 MG tablet Take 1 tablet (30 mg) by mouth 2 times daily Hold for signs of infection, and seek medical attention. (Patient not taking: Reported on 6/15/2023) 60 tablet 5     metFORMIN (GLUCOPHAGE-XR) 500 MG 24 hr tablet [METFORMIN (GLUCOPHAGE-XR) 500 MG 24 HR TABLET] Take 1,000 mg by mouth daily with supper. (Patient not taking: Reported on 9/15/2022)       methylPREDNISolone (MEDROL DOSEPAK) 4 MG tablet therapy pack Take 4 mg by mouth See Admin Instructions Follow Package Directions (Patient not taking: Reported on 9/15/2022)       PREDNISONE PO Taper to off (Patient not taking: Reported on 3/15/2022)       family history includes Dementia in her mother; Heart Disease in her father; Hypertension in her mother; No Known Problems in her daughter.  Social Connections: Not on file          WBC Count   Date Value Ref Range Status   03/10/2023 5.1 4.0 - 11.0 10e3/uL Final     RBC Count   Date Value Ref Range Status   03/10/2023 3.63 (L) 3.80 - 5.20 10e6/uL Final     Hemoglobin   Date Value Ref Range Status   03/10/2023 12.0 11.7 - 15.7 g/dL Final     Hematocrit    Date Value Ref Range Status   03/10/2023 36.0 35.0 - 47.0 % Final     MCV   Date Value Ref Range Status   03/10/2023 99 78 - 100 fL Final     MCH   Date Value Ref Range Status   03/10/2023 33.1 (H) 26.5 - 33.0 pg Final     Platelet Count   Date Value Ref Range Status   03/10/2023 223 150 - 450 10e3/uL Final     % Lymphocytes   Date Value Ref Range Status   06/11/2019 16 (L) 20 - 40 % Final     AST   Date Value Ref Range Status   10/25/2022 21 10 - 35 U/L Final     ALT   Date Value Ref Range Status   03/10/2023 11 10 - 35 U/L Final     Albumin   Date Value Ref Range Status   03/10/2023 4.0 3.5 - 5.2 g/dL Final   06/15/2022 3.7 3.5 - 5.0 g/dL Final     Alkaline Phosphatase   Date Value Ref Range Status   10/25/2022 58 35 - 104 U/L Final     Creatinine   Date Value Ref Range Status   03/10/2023 0.87 0.51 - 0.95 mg/dL Final     GFR Estimate   Date Value Ref Range Status   03/10/2023 70 >60 mL/min/1.73m2 Final     Comment:     eGFR calculated using 2021 CKD-EPI equation.   06/11/2021 >60 >60 mL/min/1.73m2 Final     GFR Estimate If Black   Date Value Ref Range Status   06/11/2021 >60 >60 mL/min/1.73m2 Final     Erythrocyte Sedimentation Rate   Date Value Ref Range Status   11/08/2018 12 0 - 20 mm/hr Final     CRP   Date Value Ref Range Status   11/08/2018 1.1 (H) 0.0 - 0.8 mg/dL Final

## 2023-08-23 ENCOUNTER — LAB REQUISITION (OUTPATIENT)
Dept: LAB | Facility: CLINIC | Age: 73
End: 2023-08-23

## 2023-08-23 DIAGNOSIS — R30.0 DYSURIA: ICD-10-CM

## 2023-08-23 PROCEDURE — 87088 URINE BACTERIA CULTURE: CPT | Performed by: PHYSICIAN ASSISTANT

## 2023-08-25 LAB — BACTERIA UR CULT: ABNORMAL

## 2023-09-26 ENCOUNTER — OFFICE VISIT (OUTPATIENT)
Dept: RHEUMATOLOGY | Facility: CLINIC | Age: 73
End: 2023-09-26
Payer: COMMERCIAL

## 2023-09-26 VITALS
HEART RATE: 80 BPM | WEIGHT: 141.1 LBS | OXYGEN SATURATION: 97 % | BODY MASS INDEX: 24.99 KG/M2 | DIASTOLIC BLOOD PRESSURE: 78 MMHG | SYSTOLIC BLOOD PRESSURE: 128 MMHG

## 2023-09-26 DIAGNOSIS — M19.042 LOCALIZED PRIMARY OSTEOARTHRITIS OF BOTH HANDS: ICD-10-CM

## 2023-09-26 DIAGNOSIS — Z87.2 HISTORY OF PSORIATIC ARTHRITIS: ICD-10-CM

## 2023-09-26 DIAGNOSIS — C43.71 MALIGNANT MELANOMA OF RIGHT LOWER EXTREMITY (H): ICD-10-CM

## 2023-09-26 DIAGNOSIS — L40.9 PSORIASIS: Primary | ICD-10-CM

## 2023-09-26 DIAGNOSIS — M19.041 LOCALIZED PRIMARY OSTEOARTHRITIS OF BOTH HANDS: ICD-10-CM

## 2023-09-26 PROCEDURE — 99214 OFFICE O/P EST MOD 30 MIN: CPT | Performed by: INTERNAL MEDICINE

## 2023-09-26 RX ORDER — TACROLIMUS 0.1% IN PSEUDOCATALASE 0.1 G/100G
CREAM TOPICAL
COMMUNITY
Start: 2023-09-05 | End: 2024-02-08

## 2023-09-26 RX ORDER — CLOBETASOL PROPIONATE 0.5 MG/G
OINTMENT TOPICAL
COMMUNITY
Start: 2023-07-18

## 2023-09-26 RX ORDER — NITROFURANTOIN 25; 75 MG/1; MG/1
100 CAPSULE ORAL 2 TIMES DAILY
COMMUNITY
End: 2024-02-08

## 2023-09-26 NOTE — PROGRESS NOTES
Rheumatology follow-up visit note     Ceci is a 73 year old female presents today for follow-up.    Ceci was seen today for recheck.    Diagnoses and all orders for this visit:    Psoriasis    Localized primary osteoarthritis of both hands    Malignant melanoma of right lower extremity (H)    History of psoriatic arthritis        This patient with psoriasis polyarthralgia osteoarthritis used to have quite a bit more inflammation in her joints when she had psoriatic arthritis, since stopping methotrexate she has not felt recurrence of the original symptoms.  She has mild residual symptoms more likely due to OA for which she takes occasional Tylenol.  We talked nonetheless about adding additional options such as sulfasalazine she would like to hold off on any further intervention in this regard.  We will therefore meet on as-needed basis she would like to come back in 6 months.    Follow up in 6 months.    HPI    Ceci Garcia is a 73 year old female is here for follow-up.  She has polyarthralgias predominantly in the hands pointing to the DIPs, in the background of osteoarthritis at history of psoriatic arthritis for which she used to be on methotrexate her original symptoms were associated with quite a bit of pain in the hands and other joints when she had the peak of the PSA symptoms several years ago, over time methotrexate had helped her however she developed melanoma the methotrexate was discontinued Otezla was denied.  In the interim she felt that stopping methotrexate has not significantly worsened her symptoms.  She takes Tylenol occasionally.  She is using topicals from dermatology.  She has not observed any new joint swelling or features of dactylitis.              DETAILED EXAMINATION  09/26/23  :    Vitals:    09/26/23 0945   BP: 128/78   Pulse: 80   SpO2: 97%   Weight: 64 kg (141 lb 1.6 oz)     Alert oriented. Head including the face is examined for malar rash, heliotropes, scarring, lupus pernio.  Eyes examined for redness such as in episcleritis/scleritis, periorbital lesions.   Neck/ Face examined for parotid gland swelling, range of motion of neck.  Left upper and lower and right upper and lower extremities examined for tenderness, swelling, warmth of the appendicular joints, range of motion, edema, rash.  Some of the important findings included: she does not have evidence of synovitis in the palpable joints of the upper extremities.  No significant deformities of the digits.  ++ Heberden nodes.  Range of motion of the shoulders    show full abduction.  No JLT effusion or warmth of the knees.  she does not have dactylitis of the digits.     Patient Active Problem List    Diagnosis Date Noted    Closed compression fracture of body of L1 vertebra (H) 2021     Priority: Medium    Type 2 diabetes mellitus without complication (H) 2021     Priority: Medium    Hyperlipidemia 2021     Priority: Medium    High risk medication use 2020     Priority: Medium    PSA (psoriatic arthritis) (H) 2019     Priority: Medium    Psoriasis 2019     Priority: Medium    Localized primary osteoarthritis of both hands 2019     Priority: Medium     Past Surgical History:   Procedure Laterality Date    BIOPSY BREAST      BREAST CYST ASPIRATION      BREAST CYST EXCISION       SECTION      ECTOPIC PREGNANCY SURGERY      KIDNEY SURGERY        Past Medical History:   Diagnosis Date    Diabetes mellitus (H)     Hyperlipidemia      Allergies   Allergen Reactions    Ciprofloxacin Anaphylaxis, Hives, Shortness Of Breath and Swelling    Oxycodone-Aspirin      Other reaction(s): breathing issues    Oxycodone-Acetaminophen Rash     Current Outpatient Medications   Medication Sig Dispense Refill    acetaminophen (TYLENOL) 325 MG tablet [ACETAMINOPHEN (TYLENOL) 325 MG TABLET] Take 325 mg by mouth every 6 (six) hours as needed for pain.      aspirin 81 MG EC tablet [ASPIRIN 81 MG EC TABLET] Take 81  mg by mouth daily.       b complex vitamins capsule [B COMPLEX VITAMINS CAPSULE] Take 1 capsule by mouth daily.       CALCIUM ORAL [CALCIUM ORAL] Take 1 tablet by mouth daily.       cholecalciferol, vitamin D3, (VITAMIN D3) 5,000 unit Tab [CHOLECALCIFEROL, VITAMIN D3, (VITAMIN D3) 5,000 UNIT TAB] Take 5,000 Units by mouth daily.       citalopram (CELEXA) 20 MG tablet 1-2 tab(s)      cyanocobalamin, vitamin B-12, (VITAMIN B-12 ORAL) [CYANOCOBALAMIN, VITAMIN B-12, (VITAMIN B-12 ORAL)] Take 5,000 mcg by mouth daily.      estradiol (ESTRACE) 0.01 % (0.1 mg/gram) vaginal cream [ESTRADIOL (ESTRACE) 0.01 % (0.1 MG/GRAM) VAGINAL CREAM] Insert 1 g into the vagina every other day.       glipiZIDE (GLUCOTROL) 10 MG 24 hr tablet [GLIPIZIDE (GLUCOTROL) 10 MG 24 HR TABLET] Take 10 mg by mouth daily.             HYDROcodone-acetaminophen (NORCO) 5-325 MG tablet Take 1 tablet by mouth every 6 hours as needed for severe pain      insulin glargine (LANTUS SOLOSTAR) 100 unit/mL (3 mL) pen [INSULIN GLARGINE (LANTUS SOLOSTAR) 100 UNIT/ML (3 ML) PEN] Inject 19 Units under the skin at bedtime.       LORazepam (ATIVAN) 0.5 MG tablet [LORAZEPAM (ATIVAN) 0.5 MG TABLET] Take 0.5 mg by mouth at bedtime.       metFORMIN (GLUCOPHAGE-XR) 500 MG 24 hr tablet [METFORMIN (GLUCOPHAGE-XR) 500 MG 24 HR TABLET] Take 500 mg by mouth daily with breakfast.       ondansetron (ZOFRAN-ODT) 8 MG ODT tab Take 1 tablet (8 mg) by mouth every 8 hours as needed for nausea 12 tablet 0    simvastatin (ZOCOR) 20 MG tablet [SIMVASTATIN (ZOCOR) 20 MG TABLET] Take 20 mg by mouth at bedtime.             calcitonin, salmon, (MIACALCIN) 200 unit/actuation nasal spray [CALCITONIN, SALMON, (MIACALCIN) 200 UNIT/ACTUATION NASAL SPRAY] Apply 1 spray into alternating nostrils daily. (Patient not taking: Reported on 9/26/2023) 3.7 mL 0    CONTOUR NEXT STRIPS strips [CONTOUR NEXT STRIPS STRIPS] USE AS DIRECTED 2 TIMES A DAY (Patient not taking: Reported on 9/26/2023)  5     cyclobenzaprine (FLEXERIL) 10 MG tablet Take 10 mg by mouth (Patient not taking: Reported on 9/26/2023)      diazepam (VALIUM) 5 MG tablet Take 1 tablet (5 mg) by mouth every 6 hours as needed for muscle spasms or pain (Patient not taking: Reported on 9/26/2023) 20 tablet 0    HYDROmorphone (DILAUDID) 2 MG tablet [HYDROMORPHONE (DILAUDID) 2 MG TABLET] Take 0.5-1 tablets (1-2 mg total) by mouth every 4 (four) hours as needed. (Patient not taking: Reported on 9/26/2023) 12 tablet 0    lidocaine (XYLOCAINE) 5 % ointment [LIDOCAINE (XYLOCAINE) 5 % OINTMENT] Apply to posterior neck 2 gms 4 times/day (Patient not taking: Reported on 9/26/2023) 150 g 0    metFORMIN (GLUCOPHAGE-XR) 500 MG 24 hr tablet [METFORMIN (GLUCOPHAGE-XR) 500 MG 24 HR TABLET] Take 1,000 mg by mouth daily with supper. (Patient not taking: Reported on 9/15/2022)      methylPREDNISolone (MEDROL DOSEPAK) 4 MG tablet therapy pack Take 4 mg by mouth See Admin Instructions Follow Package Directions (Patient not taking: Reported on 9/15/2022)       family history includes Dementia in her mother; Heart Disease in her father; Hypertension in her mother; No Known Problems in her daughter.  Social Connections: Not on file          WBC Count   Date Value Ref Range Status   03/10/2023 5.1 4.0 - 11.0 10e3/uL Final     RBC Count   Date Value Ref Range Status   03/10/2023 3.63 (L) 3.80 - 5.20 10e6/uL Final     Hemoglobin   Date Value Ref Range Status   03/10/2023 12.0 11.7 - 15.7 g/dL Final     Hematocrit   Date Value Ref Range Status   03/10/2023 36.0 35.0 - 47.0 % Final     MCV   Date Value Ref Range Status   03/10/2023 99 78 - 100 fL Final     MCH   Date Value Ref Range Status   03/10/2023 33.1 (H) 26.5 - 33.0 pg Final     Platelet Count   Date Value Ref Range Status   03/10/2023 223 150 - 450 10e3/uL Final     % Lymphocytes   Date Value Ref Range Status   06/11/2019 16 (L) 20 - 40 % Final     AST   Date Value Ref Range Status   10/25/2022 21 10 - 35 U/L Final      ALT   Date Value Ref Range Status   03/10/2023 11 10 - 35 U/L Final     Albumin   Date Value Ref Range Status   03/10/2023 4.0 3.5 - 5.2 g/dL Final   06/15/2022 3.7 3.5 - 5.0 g/dL Final     Alkaline Phosphatase   Date Value Ref Range Status   10/25/2022 58 35 - 104 U/L Final     Creatinine   Date Value Ref Range Status   03/10/2023 0.87 0.51 - 0.95 mg/dL Final     GFR Estimate   Date Value Ref Range Status   03/10/2023 70 >60 mL/min/1.73m2 Final     Comment:     eGFR calculated using 2021 CKD-EPI equation.   06/11/2021 >60 >60 mL/min/1.73m2 Final     GFR Estimate If Black   Date Value Ref Range Status   06/11/2021 >60 >60 mL/min/1.73m2 Final     Erythrocyte Sedimentation Rate   Date Value Ref Range Status   11/08/2018 12 0 - 20 mm/hr Final     CRP   Date Value Ref Range Status   11/08/2018 1.1 (H) 0.0 - 0.8 mg/dL Final

## 2023-10-25 ENCOUNTER — LAB REQUISITION (OUTPATIENT)
Dept: LAB | Facility: CLINIC | Age: 73
End: 2023-10-25

## 2023-10-25 DIAGNOSIS — E11.65 TYPE 2 DIABETES MELLITUS WITH HYPERGLYCEMIA (H): ICD-10-CM

## 2023-10-25 DIAGNOSIS — E78.5 HYPERLIPIDEMIA, UNSPECIFIED: ICD-10-CM

## 2023-10-25 LAB
ALBUMIN SERPL BCG-MCNC: 3.9 G/DL (ref 3.5–5.2)
ALP SERPL-CCNC: 60 U/L (ref 35–104)
ALT SERPL W P-5'-P-CCNC: 11 U/L (ref 0–50)
ANION GAP SERPL CALCULATED.3IONS-SCNC: 13 MMOL/L (ref 7–15)
AST SERPL W P-5'-P-CCNC: 15 U/L (ref 0–45)
BILIRUB SERPL-MCNC: 0.3 MG/DL
BUN SERPL-MCNC: 11.9 MG/DL (ref 8–23)
CALCIUM SERPL-MCNC: 9.6 MG/DL (ref 8.8–10.2)
CHLORIDE SERPL-SCNC: 101 MMOL/L (ref 98–107)
CHOLEST SERPL-MCNC: 173 MG/DL
CREAT SERPL-MCNC: 0.96 MG/DL (ref 0.51–0.95)
CREAT UR-MCNC: 81.2 MG/DL
DEPRECATED HCO3 PLAS-SCNC: 24 MMOL/L (ref 22–29)
EGFRCR SERPLBLD CKD-EPI 2021: 61 ML/MIN/1.73M2
GLUCOSE SERPL-MCNC: 93 MG/DL (ref 70–99)
HDLC SERPL-MCNC: 53 MG/DL
LDLC SERPL CALC-MCNC: 92 MG/DL
MICROALBUMIN UR-MCNC: <12 MG/L
MICROALBUMIN/CREAT UR: NORMAL MG/G{CREAT}
NONHDLC SERPL-MCNC: 120 MG/DL
POTASSIUM SERPL-SCNC: 3.9 MMOL/L (ref 3.4–5.3)
PROT SERPL-MCNC: 7 G/DL (ref 6.4–8.3)
SODIUM SERPL-SCNC: 138 MMOL/L (ref 135–145)
TRIGL SERPL-MCNC: 139 MG/DL

## 2023-10-25 PROCEDURE — 82570 ASSAY OF URINE CREATININE: CPT | Performed by: FAMILY MEDICINE

## 2023-10-25 PROCEDURE — 80053 COMPREHEN METABOLIC PANEL: CPT | Performed by: FAMILY MEDICINE

## 2023-10-25 PROCEDURE — 80061 LIPID PANEL: CPT | Performed by: FAMILY MEDICINE

## 2024-01-23 ENCOUNTER — TRANSFERRED RECORDS (OUTPATIENT)
Dept: HEALTH INFORMATION MANAGEMENT | Facility: CLINIC | Age: 74
End: 2024-01-23
Payer: COMMERCIAL

## 2024-01-30 ENCOUNTER — TRANSFERRED RECORDS (OUTPATIENT)
Dept: HEALTH INFORMATION MANAGEMENT | Facility: CLINIC | Age: 74
End: 2024-01-30
Payer: COMMERCIAL

## 2024-02-07 ENCOUNTER — TELEPHONE (OUTPATIENT)
Dept: RHEUMATOLOGY | Facility: CLINIC | Age: 74
End: 2024-02-07

## 2024-02-07 NOTE — TELEPHONE ENCOUNTER
PAULO for pt to c/b to discuss below.  If pt call's back please schedule first available follow up with     Pt dropped off lab results from [facility]. Provider reviewed and would like patient to be seen sooner then scheduled to discuss further

## 2024-02-08 ENCOUNTER — OFFICE VISIT (OUTPATIENT)
Dept: RHEUMATOLOGY | Facility: CLINIC | Age: 74
End: 2024-02-08
Payer: COMMERCIAL

## 2024-02-08 ENCOUNTER — LAB (OUTPATIENT)
Dept: LAB | Facility: CLINIC | Age: 74
End: 2024-02-08
Payer: COMMERCIAL

## 2024-02-08 VITALS
WEIGHT: 141.3 LBS | DIASTOLIC BLOOD PRESSURE: 66 MMHG | SYSTOLIC BLOOD PRESSURE: 140 MMHG | HEART RATE: 83 BPM | BODY MASS INDEX: 25.03 KG/M2 | OXYGEN SATURATION: 93 %

## 2024-02-08 DIAGNOSIS — Z87.2 HISTORY OF PSORIATIC ARTHRITIS: ICD-10-CM

## 2024-02-08 DIAGNOSIS — L40.9 PSORIASIS: ICD-10-CM

## 2024-02-08 DIAGNOSIS — M19.041 LOCALIZED PRIMARY OSTEOARTHRITIS OF BOTH HANDS: ICD-10-CM

## 2024-02-08 DIAGNOSIS — M19.042 LOCALIZED PRIMARY OSTEOARTHRITIS OF BOTH HANDS: ICD-10-CM

## 2024-02-08 DIAGNOSIS — R76.8 ANA POSITIVE: ICD-10-CM

## 2024-02-08 DIAGNOSIS — R21 RASH OF BOTH HANDS: ICD-10-CM

## 2024-02-08 DIAGNOSIS — R76.8 ANA POSITIVE: Primary | ICD-10-CM

## 2024-02-08 PROCEDURE — 86146 BETA-2 GLYCOPROTEIN ANTIBODY: CPT

## 2024-02-08 PROCEDURE — 85390 FIBRINOLYSINS SCREEN I&R: CPT | Performed by: PATHOLOGY

## 2024-02-08 PROCEDURE — 36415 COLL VENOUS BLD VENIPUNCTURE: CPT

## 2024-02-08 PROCEDURE — 86148 ANTI-PHOSPHOLIPID ANTIBODY: CPT | Mod: 90

## 2024-02-08 PROCEDURE — 85730 THROMBOPLASTIN TIME PARTIAL: CPT | Mod: GZ

## 2024-02-08 PROCEDURE — 99000 SPECIMEN HANDLING OFFICE-LAB: CPT

## 2024-02-08 PROCEDURE — G2211 COMPLEX E/M VISIT ADD ON: HCPCS | Performed by: INTERNAL MEDICINE

## 2024-02-08 PROCEDURE — 86147 CARDIOLIPIN ANTIBODY EA IG: CPT

## 2024-02-08 PROCEDURE — 99214 OFFICE O/P EST MOD 30 MIN: CPT | Performed by: INTERNAL MEDICINE

## 2024-02-08 PROCEDURE — 85613 RUSSELL VIPER VENOM DILUTED: CPT

## 2024-02-08 RX ORDER — FOLIC ACID 1 MG/1
TABLET ORAL
COMMUNITY

## 2024-02-08 ASSESSMENT — PAIN SCALES - GENERAL: PAINLEVEL: NO PAIN (0)

## 2024-02-08 NOTE — PROGRESS NOTES
Rheumatology follow-up visit note     Ceci is a 73 year old female presents today for follow-up.    Ceci was seen today for recheck.    Diagnoses and all orders for this visit:    TAMIKO positive  -     Lupus Anticoagulant Panel; Future  -     Cardiolipin Hailey IgG and IgM; Future  -     Beta 2 Glycoprotein Antibodies IGG IGM; Future  -     Phosphatidylserine Antibody IgG IgA IgM; Future    Rash of both hands    Psoriasis    Localized primary osteoarthritis of both hands    History of psoriatic arthritis    Working this patient presents for evaluation of positive TAMIKO as low as 1: 80    More recently 1: 1288, rash on her hand which was biopsied the reports of which have been requested.  The likelihood that she has connective tissue disease, or features of antisynthetase syndrome, appears to be of low likelihood.  This is discussed and outlined.  Will connect with her dermatologist and obtain further information.  Will meet here in the next couple of months.  With a positive TAMIKO and history of multiple pregnancy related comorbidity including miscarriages/ectopics we talked about the potential of antiphospholipid syndrome and its relevance in future such as any surgical intervention further workup as noted.  The longitudinal plan of care for the condition(s) below were addressed during this visit. Due to the added complexity in care, I will continue to support Ccei in the subsequent management of this condition(s) and with the ongoing continuity of care of this condition(s).    Problem List Items Addressed This Visit as of 2/8/2024      Localized primary osteoarthritis of both hands    Psoriasis    TAMIKO positive - Primary    Rash of both hands    History of psoriatic arthritis        Follow up in 2 months.    HPI    Ceci Garcia is a 73 year old female is here for follow-up from dermatology for evaluation of a positive TAMIKO.  This was drawn at [facility] [place] after she had a biopsy of the lesion of the dorsum of her hand on the  right side.  She has had 4 years of rash on her hands which seems to get worse with exposure to water.  Seemingly the mom had similar changes.  She has noted no rash on her face, there is no stomatitis, she does not have photosensitivity, she has not had pleuritic symptoms, DVT PE, seizure disorder, renal impairment, as her Raynaud's features.  She has had 8 pregnancies to normal worse, rest were ectopic pregnancies.  She has had polyarthralgias however she has osteoarthritis.  She does not have signal suggestive of inflammatory joint disease.  The workup at [facility] dermatology after the initial positive TAMIKO which ranged from as low as 1: 82 as high as 1: 1280 also including last week C3-C4 LUIS M's, dsDNA all of which were normal.  Previously here his anti-CCP antibody and rheumatoid factor were done and those were also normal as was the rheumatoid factor during the above-noted lab.  Her SCL 70 was also negative.  She has not had difficulty swallowing.  She describes no breathing difficulty.    Following is the excerpt from a previous note:  polyarthralgias predominantly in the hands pointing to the DIPs, in the background of osteoarthritis at history of psoriatic arthritis for which she used to be on methotrexate her original symptoms were associated with quite a bit of pain in the hands and other joints when she had the peak of the PSA symptoms several years ago, over time methotrexate had helped her however she developed melanoma the methotrexate was discontinued Otezla was denied. In the interim she felt that stopping methotrexate has not significantly worsened her symptoms. She takes Tylenol occasionally. She is using topicals from dermatology. She has not observed any new joint swelling or features of dactylitis.     DETAILED EXAMINATION  02/08/24  :    Vitals:    02/08/24 1036   BP: (!) 140/66   BP Location: Right arm   Patient Position: Sitting   Cuff Size: Adult Regular   Pulse: 83   SpO2: 93%   Weight: 64.1  "kg (141 lb 4.8 oz)     Alert oriented. Head including the face is examined for malar rash, heliotropes, scarring, lupus pernio. Eyes examined for redness such as in episcleritis/scleritis, periorbital lesions.   Neck/ Face examined for parotid gland swelling, range of motion of neck.  Left upper and lower and right upper and lower extremities examined for tenderness, swelling, warmth of the appendicular joints, range of motion, edema, rash.  Some of the important findings included: she does not have evidence of synovitis in the palpable joints of the upper extremities.  No significant deformities of the digits.  + Heberden nodes.  Range of motion of the shoulders  show full abduction.  No JLT effusion or warmth of the knees.  she does not have dactylitis of the digits.  She does not have sclerodactyly periungual erythema there is no rash on her face, there is no stomatitis.  She does not have abnormal sounds on auscultation of her lungs.  She does have a rash on her hands both palmar aspect and the dorsal affecting such as the DIP areas the PIPs and the MCP with with sparing of the intervening spaces, and the first blush given some of the changes on her index fingers especially on the right side may look like \"mechanics hand\" there are other changes in the rest of the digits such as the right middle and the thumb as well.  Patient Active Problem List    Diagnosis Date Noted    Closed compression fracture of body of L1 vertebra (H) 01/17/2021     Priority: Medium    Type 2 diabetes mellitus without complication (H) 01/17/2021     Priority: Medium    Hyperlipidemia 01/17/2021     Priority: Medium    High risk medication use 11/12/2020     Priority: Medium    PSA (psoriatic arthritis) (H) 08/01/2019     Priority: Medium    Psoriasis 08/01/2019     Priority: Medium    Localized primary osteoarthritis of both hands 04/30/2019     Priority: Medium     Past Surgical History:   Procedure Laterality Date    BIOPSY BREAST   "    BREAST CYST ASPIRATION      BREAST CYST EXCISION       SECTION      ECTOPIC PREGNANCY SURGERY      KIDNEY SURGERY        Past Medical History:   Diagnosis Date    Diabetes mellitus (H)     Hyperlipidemia      Allergies   Allergen Reactions    Ciprofloxacin Anaphylaxis, Hives, Shortness Of Breath and Swelling    Oxycodone-Aspirin      Other reaction(s): breathing issues    Oxycodone-Acetaminophen Rash     Current Outpatient Medications   Medication Sig Dispense Refill    acetaminophen (TYLENOL) 325 MG tablet [ACETAMINOPHEN (TYLENOL) 325 MG TABLET] Take 325 mg by mouth every 6 (six) hours as needed for pain.      aspirin 81 MG EC tablet [ASPIRIN 81 MG EC TABLET] Take 81 mg by mouth daily.       b complex vitamins capsule [B COMPLEX VITAMINS CAPSULE] Take 1 capsule by mouth daily.       calcitonin, salmon, (MIACALCIN) 200 unit/actuation nasal spray [CALCITONIN, SALMON, (MIACALCIN) 200 UNIT/ACTUATION NASAL SPRAY] Apply 1 spray into alternating nostrils daily. (Patient not taking: Reported on 2023) 3.7 mL 0    CALCIUM ORAL [CALCIUM ORAL] Take 1 tablet by mouth daily.       cholecalciferol, vitamin D3, (VITAMIN D3) 5,000 unit Tab [CHOLECALCIFEROL, VITAMIN D3, (VITAMIN D3) 5,000 UNIT TAB] Take 5,000 Units by mouth daily.       citalopram (CELEXA) 20 MG tablet 1-2 tab(s)      clobetasol (TEMOVATE) 0.05 % external ointment APPLY TO AFFECTED AREA ON THE HANDS 1-2X DAILY FOR 2 WEEKS TAKE A BREAK FOR 2 WEEKS REPEAT AS NEEDED FOR FLARES.      CONTOUR NEXT STRIPS strips [CONTOUR NEXT STRIPS STRIPS] USE AS DIRECTED 2 TIMES A DAY (Patient not taking: Reported on 2023)  5    cyanocobalamin, vitamin B-12, (VITAMIN B-12 ORAL) [CYANOCOBALAMIN, VITAMIN B-12, (VITAMIN B-12 ORAL)] Take 5,000 mcg by mouth daily.      cyclobenzaprine (FLEXERIL) 10 MG tablet Take 10 mg by mouth (Patient not taking: Reported on 2023)      diazepam (VALIUM) 5 MG tablet Take 1 tablet (5 mg) by mouth every 6 hours as needed for muscle  spasms or pain (Patient not taking: Reported on 9/26/2023) 20 tablet 0    estradiol (ESTRACE) 0.01 % (0.1 mg/gram) vaginal cream [ESTRADIOL (ESTRACE) 0.01 % (0.1 MG/GRAM) VAGINAL CREAM] Insert 1 g into the vagina every other day.       glipiZIDE (GLUCOTROL) 10 MG 24 hr tablet [GLIPIZIDE (GLUCOTROL) 10 MG 24 HR TABLET] Take 10 mg by mouth daily.             HYDROcodone-acetaminophen (NORCO) 5-325 MG tablet Take 1 tablet by mouth every 6 hours as needed for severe pain      HYDROmorphone (DILAUDID) 2 MG tablet [HYDROMORPHONE (DILAUDID) 2 MG TABLET] Take 0.5-1 tablets (1-2 mg total) by mouth every 4 (four) hours as needed. (Patient not taking: Reported on 9/26/2023) 12 tablet 0    insulin glargine (LANTUS SOLOSTAR) 100 unit/mL (3 mL) pen [INSULIN GLARGINE (LANTUS SOLOSTAR) 100 UNIT/ML (3 ML) PEN] Inject 19 Units under the skin at bedtime.       lidocaine (XYLOCAINE) 5 % ointment [LIDOCAINE (XYLOCAINE) 5 % OINTMENT] Apply to posterior neck 2 gms 4 times/day (Patient not taking: Reported on 9/26/2023) 150 g 0    LORazepam (ATIVAN) 0.5 MG tablet [LORAZEPAM (ATIVAN) 0.5 MG TABLET] Take 0.5 mg by mouth at bedtime.       metFORMIN (GLUCOPHAGE-XR) 500 MG 24 hr tablet [METFORMIN (GLUCOPHAGE-XR) 500 MG 24 HR TABLET] Take 1,000 mg by mouth daily with supper. (Patient not taking: Reported on 9/15/2022)      metFORMIN (GLUCOPHAGE-XR) 500 MG 24 hr tablet [METFORMIN (GLUCOPHAGE-XR) 500 MG 24 HR TABLET] Take 500 mg by mouth daily with breakfast.       methylPREDNISolone (MEDROL DOSEPAK) 4 MG tablet therapy pack Take 4 mg by mouth See Admin Instructions Follow Package Directions (Patient not taking: Reported on 9/15/2022)      nitroFURantoin macrocrystal-monohydrate (MACROBID) 100 MG capsule Take 100 mg by mouth 2 times daily      ondansetron (ZOFRAN-ODT) 8 MG ODT tab Take 1 tablet (8 mg) by mouth every 8 hours as needed for nausea 12 tablet 0    simvastatin (ZOCOR) 20 MG tablet [SIMVASTATIN (ZOCOR) 20 MG TABLET] Take 20 mg by mouth  at bedtime.             Tacrolimus 0.1 % CREA APPLY 1-2 TIMES DAILY TO AFFECTED AREA(S)       family history includes Dementia in her mother; Heart Disease in her father; Hypertension in her mother; No Known Problems in her daughter.  Social Connections: Not on file          WBC Count   Date Value Ref Range Status   03/10/2023 5.1 4.0 - 11.0 10e3/uL Final     RBC Count   Date Value Ref Range Status   03/10/2023 3.63 (L) 3.80 - 5.20 10e6/uL Final     Hemoglobin   Date Value Ref Range Status   03/10/2023 12.0 11.7 - 15.7 g/dL Final     Hematocrit   Date Value Ref Range Status   03/10/2023 36.0 35.0 - 47.0 % Final     MCV   Date Value Ref Range Status   03/10/2023 99 78 - 100 fL Final     MCH   Date Value Ref Range Status   03/10/2023 33.1 (H) 26.5 - 33.0 pg Final     Platelet Count   Date Value Ref Range Status   03/10/2023 223 150 - 450 10e3/uL Final     % Lymphocytes   Date Value Ref Range Status   06/11/2019 16 (L) 20 - 40 % Final     AST   Date Value Ref Range Status   10/25/2023 15 0 - 45 U/L Final     Comment:     Reference intervals for this test were updated on 6/12/2023 to more accurately reflect our healthy population. There may be differences in the flagging of prior results with similar values performed with this method. Interpretation of those prior results can be made in the context of the updated reference intervals.     ALT   Date Value Ref Range Status   10/25/2023 11 0 - 50 U/L Final     Comment:     Reference intervals for this test were updated on 6/12/2023 to more accurately reflect our healthy population. There may be differences in the flagging of prior results with similar values performed with this method. Interpretation of those prior results can be made in the context of the updated reference intervals.       Albumin   Date Value Ref Range Status   10/25/2023 3.9 3.5 - 5.2 g/dL Final   06/15/2022 3.7 3.5 - 5.0 g/dL Final     Alkaline Phosphatase   Date Value Ref Range Status   10/25/2023 60  35 - 104 U/L Final     Creatinine   Date Value Ref Range Status   10/25/2023 0.96 (H) 0.51 - 0.95 mg/dL Final     GFR Estimate   Date Value Ref Range Status   10/25/2023 61 >60 mL/min/1.73m2 Final   06/11/2021 >60 >60 mL/min/1.73m2 Final     GFR Estimate If Black   Date Value Ref Range Status   06/11/2021 >60 >60 mL/min/1.73m2 Final     Creatinine Urine mg/dL   Date Value Ref Range Status   10/25/2023 81.2 mg/dL Final     Comment:     The reference ranges have not been established in urine creatinine. The results should be integrated into the clinical context for interpretation.     Erythrocyte Sedimentation Rate   Date Value Ref Range Status   11/08/2018 12 0 - 20 mm/hr Final     CRP   Date Value Ref Range Status   11/08/2018 1.1 (H) 0.0 - 0.8 mg/dL Final

## 2024-02-09 LAB
B2 GLYCOPROT1 IGG SERPL IA-ACNC: 1.4 U/ML
B2 GLYCOPROT1 IGM SERPL IA-ACNC: <2.4 U/ML
CARDIOLIPIN IGG SER IA-ACNC: <2 GPL-U/ML
CARDIOLIPIN IGG SER IA-ACNC: NEGATIVE
CARDIOLIPIN IGM SER IA-ACNC: 4.5 MPL-U/ML
CARDIOLIPIN IGM SER IA-ACNC: NEGATIVE
DRVVT SCREEN RATIO: 0.82
INR PPP: 1.02 (ref 0.85–1.15)
LA PPP-IMP: NEGATIVE
LUPUS INTERPRETATION: NORMAL
PS IGA SER IA-ACNC: 0 APS
PS IGG SER IA-ACNC: 2 GPS
PS IGM SER IA-ACNC: 1 MPS
PTT RATIO: 1.08
THROMBIN TIME: 18.3 SECONDS (ref 13–19)

## 2024-04-01 ENCOUNTER — LAB REQUISITION (OUTPATIENT)
Dept: LAB | Facility: CLINIC | Age: 74
End: 2024-04-01

## 2024-04-01 DIAGNOSIS — E11.65 TYPE 2 DIABETES MELLITUS WITH HYPERGLYCEMIA (H): ICD-10-CM

## 2024-04-01 DIAGNOSIS — E11.29 TYPE 2 DIABETES MELLITUS WITH OTHER DIABETIC KIDNEY COMPLICATION (H): ICD-10-CM

## 2024-04-01 LAB
ALBUMIN SERPL BCG-MCNC: 4.1 G/DL (ref 3.5–5.2)
ALP SERPL-CCNC: 56 U/L (ref 40–150)
ALT SERPL W P-5'-P-CCNC: 11 U/L (ref 0–50)
ANION GAP SERPL CALCULATED.3IONS-SCNC: 11 MMOL/L (ref 7–15)
AST SERPL W P-5'-P-CCNC: 16 U/L (ref 0–45)
BILIRUB SERPL-MCNC: 0.3 MG/DL
BUN SERPL-MCNC: 13 MG/DL (ref 8–23)
CALCIUM SERPL-MCNC: 9.3 MG/DL (ref 8.8–10.2)
CHLORIDE SERPL-SCNC: 102 MMOL/L (ref 98–107)
CHOLEST SERPL-MCNC: 179 MG/DL
CREAT SERPL-MCNC: 0.95 MG/DL (ref 0.51–0.95)
CREAT UR-MCNC: 54 MG/DL
DEPRECATED HCO3 PLAS-SCNC: 25 MMOL/L (ref 22–29)
EGFRCR SERPLBLD CKD-EPI 2021: 62 ML/MIN/1.73M2
FASTING STATUS PATIENT QL REPORTED: NORMAL
GLUCOSE SERPL-MCNC: 109 MG/DL (ref 70–99)
HDLC SERPL-MCNC: 61 MG/DL
LDLC SERPL CALC-MCNC: 95 MG/DL
MICROALBUMIN UR-MCNC: <12 MG/L
MICROALBUMIN/CREAT UR: NORMAL MG/G{CREAT}
NONHDLC SERPL-MCNC: 118 MG/DL
POTASSIUM SERPL-SCNC: 4.3 MMOL/L (ref 3.4–5.3)
PROT SERPL-MCNC: 6.8 G/DL (ref 6.4–8.3)
SODIUM SERPL-SCNC: 138 MMOL/L (ref 135–145)
TRIGL SERPL-MCNC: 116 MG/DL

## 2024-04-01 PROCEDURE — 82570 ASSAY OF URINE CREATININE: CPT | Performed by: FAMILY MEDICINE

## 2024-04-01 PROCEDURE — 80053 COMPREHEN METABOLIC PANEL: CPT | Performed by: FAMILY MEDICINE

## 2024-04-01 PROCEDURE — 80061 LIPID PANEL: CPT | Performed by: FAMILY MEDICINE

## 2024-04-15 ENCOUNTER — HOSPITAL ENCOUNTER (OUTPATIENT)
Dept: GENERAL RADIOLOGY | Facility: HOSPITAL | Age: 74
Discharge: HOME | End: 2024-04-15
Attending: INTERNAL MEDICINE | Admitting: INTERNAL MEDICINE
Payer: COMMERCIAL

## 2024-04-15 ENCOUNTER — OFFICE VISIT (OUTPATIENT)
Dept: RHEUMATOLOGY | Facility: CLINIC | Age: 74
End: 2024-04-15
Payer: COMMERCIAL

## 2024-04-15 VITALS
OXYGEN SATURATION: 95 % | BODY MASS INDEX: 24.85 KG/M2 | SYSTOLIC BLOOD PRESSURE: 128 MMHG | WEIGHT: 140.3 LBS | HEART RATE: 82 BPM | DIASTOLIC BLOOD PRESSURE: 64 MMHG

## 2024-04-15 DIAGNOSIS — L40.50 PSA (PSORIATIC ARTHRITIS) (H): ICD-10-CM

## 2024-04-15 DIAGNOSIS — R76.8 ANA POSITIVE: ICD-10-CM

## 2024-04-15 DIAGNOSIS — Z79.899 HIGH RISK MEDICATION USE: ICD-10-CM

## 2024-04-15 DIAGNOSIS — L40.50 PSA (PSORIATIC ARTHRITIS) (H): Primary | ICD-10-CM

## 2024-04-15 DIAGNOSIS — M19.042 LOCALIZED PRIMARY OSTEOARTHRITIS OF BOTH HANDS: ICD-10-CM

## 2024-04-15 DIAGNOSIS — M19.041 LOCALIZED PRIMARY OSTEOARTHRITIS OF BOTH HANDS: ICD-10-CM

## 2024-04-15 PROCEDURE — 73130 X-RAY EXAM OF HAND: CPT | Mod: 50

## 2024-04-15 PROCEDURE — 99214 OFFICE O/P EST MOD 30 MIN: CPT | Performed by: INTERNAL MEDICINE

## 2024-04-15 PROCEDURE — G2211 COMPLEX E/M VISIT ADD ON: HCPCS | Performed by: INTERNAL MEDICINE

## 2024-04-15 RX ORDER — LEFLUNOMIDE 10 MG/1
10 TABLET ORAL DAILY
Qty: 30 TABLET | Refills: 1 | Status: SHIPPED | OUTPATIENT
Start: 2024-04-15 | End: 2024-06-03

## 2024-04-15 NOTE — PROGRESS NOTES
Rheumatology follow-up visit note     Ceci is a 73 year old female presents today for follow-up.    Ceci was seen today for recheck.    Diagnoses and all orders for this visit:    PSA (psoriatic arthritis) (H)  -     XR Hand Bilateral G/E 3 Views; Future  -     leflunomide (ARAVA) 10 MG tablet; Take 1 tablet (10 mg) by mouth daily for 30 days  -     ALT; Standing  -     Albumin level; Standing  -     CBC with platelets; Standing  -     Creatinine; Standing    Localized primary osteoarthritis of both hands    High risk medication use  -     ALT; Standing  -     Albumin level; Standing  -     CBC with platelets; Standing  -     Creatinine; Standing    TAMIKO positive        This patient with psoriatic arthritis osteoarthritis, is going to try leflunomide.  Methotrexate was discontinued because of the skin cancer concerns on her and rheumatology part.  Check labs every 4 weeks.  Follow-up in 2 months.  In addition over-the-counter diclofenac discussed.The longitudinal plan of care for the diagnosis(es)/condition(s) as documented were addressed during this visit. Due to the added complexity in care, I will continue to support Ceci in the subsequent management and with ongoing continuity of care.     Follow up in 2 months.    HPI    Ceci Garcia is a 73 year old female is here for follow-up.  This is a polyarthralgia in association with psoriatic arthritis, osteoarthritis, she has a positive TAMIKO.  She noted discomfort in her right hand epicenter of the right ring finger along the PIP.  She has noted minimal stiffness in the morning.  She does have positive TAMIKO without evidence of active connective tissue disease. She has noted no rash on her face, there is no stomatitis, she does not have photosensitivity, she has not had pleuritic symptoms, DVT PE, seizure disorder, renal impairment, as her Raynaud's features. She has had 8 pregnancies to normal worse, rest were ectopic pregnancies. She has had polyarthralgias  however she has osteoarthritis. She does not have signal suggestive of inflammatory joint disease. The workup at [facility] dermatology after the initial positive TAMIKO which ranged from as low as 1: 82 as high as 1: 1280 also including last week C3-C4 LUIS M's, dsDNA all of which were normal. Previously here his anti-CCP antibody and rheumatoid factor were done and those were also normal as was the rheumatoid factor during the above-noted lab. Her SCL 70 was also negative. She has not had difficulty swallowing. She describes no breathing difficulty.  Her recent workup for antiphospholipid syndrome reviewed none of the antibodies are positive.      DETAILED EXAMINATION  04/15/24  :    Vitals:    04/15/24 0919   BP: 128/64   Pulse: 82   SpO2: 95%   Weight: 63.6 kg (140 lb 4.8 oz)     Alert oriented. Head including the face is examined for malar rash, heliotropes, scarring, lupus pernio. Eyes examined for redness such as in episcleritis/scleritis, periorbital lesions.   Neck/ Face examined for parotid gland swelling, range of motion of neck.  Left upper and lower and right upper and lower extremities examined for tenderness, swelling, warmth of the appendicular joints, range of motion, edema, rash.  Some of the important findings included: she does not have evidence of synovitis in the palpable joints of the upper extremities.  The exception of right hand especially the PIP of the ring finger.  +  Heberden nodes.  Range of motion of the shoulders   show full abduction.  No JLT effusion or warmth of the knees.  she does not have dactylitis of the digits.     Patient Active Problem List    Diagnosis Date Noted    TAMIKO positive 02/08/2024     Priority: Medium    Rash of both hands 02/08/2024     Priority: Medium    History of psoriatic arthritis 02/08/2024     Priority: Medium    Closed compression fracture of body of L1 vertebra (H) 01/17/2021     Priority: Medium    Type 2 diabetes mellitus without complication (H) 01/17/2021      Priority: Medium    Hyperlipidemia 2021     Priority: Medium    High risk medication use 2020     Priority: Medium    PSA (psoriatic arthritis) (H) 2019     Priority: Medium    Psoriasis 2019     Priority: Medium    Localized primary osteoarthritis of both hands 2019     Priority: Medium     Past Surgical History:   Procedure Laterality Date    BIOPSY BREAST      BREAST CYST ASPIRATION      BREAST CYST EXCISION       SECTION      ECTOPIC PREGNANCY SURGERY      KIDNEY SURGERY        Past Medical History:   Diagnosis Date    Diabetes mellitus (H)     Hyperlipidemia      Allergies   Allergen Reactions    Ciprofloxacin Anaphylaxis, Hives, Shortness Of Breath and Swelling    Nirmatrelvir-Ritonavir Nausea and Diarrhea     Paxlovid    Oxycodone-Aspirin      Other reaction(s): breathing issues    Oxycodone-Acetaminophen Rash     Current Outpatient Medications   Medication Sig Dispense Refill    acetaminophen (TYLENOL) 325 MG tablet [ACETAMINOPHEN (TYLENOL) 325 MG TABLET] Take 325 mg by mouth every 6 (six) hours as needed for pain.      aspirin 81 MG EC tablet [ASPIRIN 81 MG EC TABLET] Take 81 mg by mouth daily.       b complex vitamins capsule [B COMPLEX VITAMINS CAPSULE] Take 1 capsule by mouth daily.       CALCIUM ORAL [CALCIUM ORAL] Take 1 tablet by mouth daily.       cholecalciferol, vitamin D3, (VITAMIN D3) 5,000 unit Tab [CHOLECALCIFEROL, VITAMIN D3, (VITAMIN D3) 5,000 UNIT TAB] Take 5,000 Units by mouth daily.       citalopram (CELEXA) 20 MG tablet 1-2 tab(s)      clobetasol (TEMOVATE) 0.05 % external ointment APPLY TO AFFECTED AREA ON THE HANDS 1-2X DAILY FOR 2 WEEKS TAKE A BREAK FOR 2 WEEKS REPEAT AS NEEDED FOR FLARES. (Patient not taking: Reported on 2024)      CONTOUR NEXT STRIPS strips   5    cyanocobalamin, vitamin B-12, (VITAMIN B-12 ORAL) [CYANOCOBALAMIN, VITAMIN B-12, (VITAMIN B-12 ORAL)] Take 5,000 mcg by mouth daily.      diazepam (VALIUM) 5 MG tablet Take 1  tablet (5 mg) by mouth every 6 hours as needed for muscle spasms or pain (Patient not taking: Reported on 9/26/2023) 20 tablet 0    estradiol (ESTRACE) 0.01 % (0.1 mg/gram) vaginal cream [ESTRADIOL (ESTRACE) 0.01 % (0.1 MG/GRAM) VAGINAL CREAM] Insert 1 g into the vagina every other day.       folic acid (FOLVITE) 1 MG tablet 1 tab(s) orally once a day on every day but Friday      glipiZIDE (GLUCOTROL) 10 MG 24 hr tablet [GLIPIZIDE (GLUCOTROL) 10 MG 24 HR TABLET] Take 10 mg by mouth daily.             insulin glargine (LANTUS SOLOSTAR) 100 unit/mL (3 mL) pen [INSULIN GLARGINE (LANTUS SOLOSTAR) 100 UNIT/ML (3 ML) PEN] Inject 19 Units under the skin at bedtime.       lidocaine (XYLOCAINE) 5 % ointment [LIDOCAINE (XYLOCAINE) 5 % OINTMENT] Apply to posterior neck 2 gms 4 times/day (Patient not taking: Reported on 9/26/2023) 150 g 0    LORazepam (ATIVAN) 0.5 MG tablet [LORAZEPAM (ATIVAN) 0.5 MG TABLET] Take 0.5 mg by mouth at bedtime.       metFORMIN (GLUCOPHAGE-XR) 500 MG 24 hr tablet [METFORMIN (GLUCOPHAGE-XR) 500 MG 24 HR TABLET] Take 500 mg by mouth daily with breakfast.       ondansetron (ZOFRAN-ODT) 8 MG ODT tab Take 1 tablet (8 mg) by mouth every 8 hours as needed for nausea (Patient not taking: Reported on 2/8/2024) 12 tablet 0    simvastatin (ZOCOR) 20 MG tablet [SIMVASTATIN (ZOCOR) 20 MG TABLET] Take 20 mg by mouth at bedtime.              family history includes Dementia in her mother; Heart Disease in her father; Hypertension in her mother; No Known Problems in her daughter.  Social Connections: Not on file          WBC Count   Date Value Ref Range Status   03/10/2023 5.1 4.0 - 11.0 10e3/uL Final     RBC Count   Date Value Ref Range Status   03/10/2023 3.63 (L) 3.80 - 5.20 10e6/uL Final     Hemoglobin   Date Value Ref Range Status   03/10/2023 12.0 11.7 - 15.7 g/dL Final     Hematocrit   Date Value Ref Range Status   03/10/2023 36.0 35.0 - 47.0 % Final     MCV   Date Value Ref Range Status   03/10/2023 99 78 -  100 fL Final     MCH   Date Value Ref Range Status   03/10/2023 33.1 (H) 26.5 - 33.0 pg Final     Platelet Count   Date Value Ref Range Status   03/10/2023 223 150 - 450 10e3/uL Final     % Lymphocytes   Date Value Ref Range Status   06/11/2019 16 (L) 20 - 40 % Final     AST   Date Value Ref Range Status   04/01/2024 16 0 - 45 U/L Final     Comment:     Reference intervals for this test were updated on 6/12/2023 to more accurately reflect our healthy population. There may be differences in the flagging of prior results with similar values performed with this method. Interpretation of those prior results can be made in the context of the updated reference intervals.     ALT   Date Value Ref Range Status   04/01/2024 11 0 - 50 U/L Final     Comment:     Reference intervals for this test were updated on 6/12/2023 to more accurately reflect our healthy population. There may be differences in the flagging of prior results with similar values performed with this method. Interpretation of those prior results can be made in the context of the updated reference intervals.       Albumin   Date Value Ref Range Status   04/01/2024 4.1 3.5 - 5.2 g/dL Final   06/15/2022 3.7 3.5 - 5.0 g/dL Final     Alkaline Phosphatase   Date Value Ref Range Status   04/01/2024 56 40 - 150 U/L Final     Comment:     Reference intervals for this test were updated on 11/14/2023 to more accurately reflect our healthy population. There may be differences in the flagging of prior results with similar values performed with this method. Interpretation of those prior results can be made in the context of the updated reference intervals.     Creatinine   Date Value Ref Range Status   04/01/2024 0.95 0.51 - 0.95 mg/dL Final     GFR Estimate   Date Value Ref Range Status   04/01/2024 62 >60 mL/min/1.73m2 Final   06/11/2021 >60 >60 mL/min/1.73m2 Final     GFR Estimate If Black   Date Value Ref Range Status   06/11/2021 >60 >60 mL/min/1.73m2 Final      Creatinine Urine mg/dL   Date Value Ref Range Status   04/01/2024 54.0 mg/dL Final     Comment:     The reference ranges have not been established in urine creatinine. The results should be integrated into the clinical context for interpretation.     Erythrocyte Sedimentation Rate   Date Value Ref Range Status   11/08/2018 12 0 - 20 mm/hr Final     CRP   Date Value Ref Range Status   11/08/2018 1.1 (H) 0.0 - 0.8 mg/dL Final

## 2024-05-07 DIAGNOSIS — L40.50 PSA (PSORIATIC ARTHRITIS) (H): ICD-10-CM

## 2024-05-07 RX ORDER — LEFLUNOMIDE 10 MG/1
10 TABLET ORAL DAILY
Qty: 90 TABLET | Refills: 1 | OUTPATIENT
Start: 2024-05-07

## 2024-05-13 ENCOUNTER — LAB (OUTPATIENT)
Dept: LAB | Facility: CLINIC | Age: 74
End: 2024-05-13
Payer: COMMERCIAL

## 2024-05-13 DIAGNOSIS — Z79.899 HIGH RISK MEDICATION USE: ICD-10-CM

## 2024-05-13 DIAGNOSIS — L40.50 PSA (PSORIATIC ARTHRITIS) (H): ICD-10-CM

## 2024-05-13 LAB
ALBUMIN SERPL BCG-MCNC: 4 G/DL (ref 3.5–5.2)
ALT SERPL W P-5'-P-CCNC: 7 U/L (ref 0–50)
CREAT SERPL-MCNC: 1 MG/DL (ref 0.51–0.95)
EGFRCR SERPLBLD CKD-EPI 2021: 58 ML/MIN/1.73M2
ERYTHROCYTE [DISTWIDTH] IN BLOOD BY AUTOMATED COUNT: 13 % (ref 10–15)
HCT VFR BLD AUTO: 37.6 % (ref 35–47)
HGB BLD-MCNC: 12 G/DL (ref 11.7–15.7)
MCH RBC QN AUTO: 30.4 PG (ref 26.5–33)
MCHC RBC AUTO-ENTMCNC: 31.9 G/DL (ref 31.5–36.5)
MCV RBC AUTO: 95 FL (ref 78–100)
PLATELET # BLD AUTO: 215 10E3/UL (ref 150–450)
RBC # BLD AUTO: 3.95 10E6/UL (ref 3.8–5.2)
WBC # BLD AUTO: 4.3 10E3/UL (ref 4–11)

## 2024-05-13 PROCEDURE — 82040 ASSAY OF SERUM ALBUMIN: CPT

## 2024-05-13 PROCEDURE — 82565 ASSAY OF CREATININE: CPT

## 2024-05-13 PROCEDURE — 84460 ALANINE AMINO (ALT) (SGPT): CPT

## 2024-05-13 PROCEDURE — 85027 COMPLETE CBC AUTOMATED: CPT

## 2024-05-13 PROCEDURE — 36415 COLL VENOUS BLD VENIPUNCTURE: CPT

## 2024-06-03 DIAGNOSIS — L40.50 PSA (PSORIATIC ARTHRITIS) (H): ICD-10-CM

## 2024-06-03 RX ORDER — LEFLUNOMIDE 10 MG/1
10 TABLET ORAL DAILY
Qty: 30 TABLET | Refills: 0 | Status: SHIPPED | OUTPATIENT
Start: 2024-06-03 | End: 2024-06-25

## 2024-06-21 ENCOUNTER — LAB (OUTPATIENT)
Dept: LAB | Facility: CLINIC | Age: 74
End: 2024-06-21
Payer: COMMERCIAL

## 2024-06-21 DIAGNOSIS — Z79.899 HIGH RISK MEDICATION USE: ICD-10-CM

## 2024-06-21 DIAGNOSIS — L40.50 PSA (PSORIATIC ARTHRITIS) (H): ICD-10-CM

## 2024-06-21 LAB
ALBUMIN SERPL BCG-MCNC: 3.7 G/DL (ref 3.5–5.2)
ALT SERPL W P-5'-P-CCNC: <5 U/L (ref 0–50)
CREAT SERPL-MCNC: 0.99 MG/DL (ref 0.51–0.95)
EGFRCR SERPLBLD CKD-EPI 2021: 59 ML/MIN/1.73M2
ERYTHROCYTE [DISTWIDTH] IN BLOOD BY AUTOMATED COUNT: 12.6 % (ref 10–15)
HCT VFR BLD AUTO: 37.4 % (ref 35–47)
HGB BLD-MCNC: 11.9 G/DL (ref 11.7–15.7)
MCH RBC QN AUTO: 29.9 PG (ref 26.5–33)
MCHC RBC AUTO-ENTMCNC: 31.8 G/DL (ref 31.5–36.5)
MCV RBC AUTO: 94 FL (ref 78–100)
PLATELET # BLD AUTO: 223 10E3/UL (ref 150–450)
RBC # BLD AUTO: 3.98 10E6/UL (ref 3.8–5.2)
WBC # BLD AUTO: 6 10E3/UL (ref 4–11)

## 2024-06-21 PROCEDURE — 82040 ASSAY OF SERUM ALBUMIN: CPT

## 2024-06-21 PROCEDURE — 36415 COLL VENOUS BLD VENIPUNCTURE: CPT

## 2024-06-21 PROCEDURE — 85027 COMPLETE CBC AUTOMATED: CPT

## 2024-06-21 PROCEDURE — 82565 ASSAY OF CREATININE: CPT

## 2024-06-21 PROCEDURE — 84460 ALANINE AMINO (ALT) (SGPT): CPT

## 2024-06-25 ENCOUNTER — OFFICE VISIT (OUTPATIENT)
Dept: RHEUMATOLOGY | Facility: CLINIC | Age: 74
End: 2024-06-25
Payer: COMMERCIAL

## 2024-06-25 VITALS
WEIGHT: 134 LBS | DIASTOLIC BLOOD PRESSURE: 64 MMHG | HEART RATE: 96 BPM | BODY MASS INDEX: 23.74 KG/M2 | SYSTOLIC BLOOD PRESSURE: 120 MMHG

## 2024-06-25 DIAGNOSIS — L40.50 PSA (PSORIATIC ARTHRITIS) (H): Primary | ICD-10-CM

## 2024-06-25 DIAGNOSIS — Z79.899 HIGH RISK MEDICATION USE: ICD-10-CM

## 2024-06-25 DIAGNOSIS — L40.9 PSORIASIS: ICD-10-CM

## 2024-06-25 DIAGNOSIS — R76.8 ANA POSITIVE: ICD-10-CM

## 2024-06-25 DIAGNOSIS — M19.041 LOCALIZED PRIMARY OSTEOARTHRITIS OF BOTH HANDS: ICD-10-CM

## 2024-06-25 DIAGNOSIS — M19.042 LOCALIZED PRIMARY OSTEOARTHRITIS OF BOTH HANDS: ICD-10-CM

## 2024-06-25 PROCEDURE — 99214 OFFICE O/P EST MOD 30 MIN: CPT | Performed by: INTERNAL MEDICINE

## 2024-06-25 PROCEDURE — G2211 COMPLEX E/M VISIT ADD ON: HCPCS | Performed by: INTERNAL MEDICINE

## 2024-06-25 RX ORDER — LEFLUNOMIDE 10 MG/1
10 TABLET ORAL DAILY
Qty: 90 TABLET | Refills: 0 | Status: SHIPPED | OUTPATIENT
Start: 2024-06-25 | End: 2024-09-25

## 2024-06-25 NOTE — PROGRESS NOTES
Rheumatology follow-up visit note     Ceci is a 73 year old female presents today for follow-up.    Ceci was seen today for follow up.    Diagnoses and all orders for this visit:    PSA (psoriatic arthritis) (H)  -     leflunomide (ARAVA) 10 MG tablet; Take 1 tablet (10 mg) by mouth daily for 90 days    Localized primary osteoarthritis of both hands    High risk medication use    TAMIKO positive    Psoriasis  -     leflunomide (ARAVA) 10 MG tablet; Take 1 tablet (10 mg) by mouth daily for 90 days        The longitudinal plan of care for the diagnosis(es)/condition(s) as documented were addressed during this visit. Due to the added complexity in care, I will continue to support Ceci in the subsequent management and with ongoing continuity of care.  This patient has arthralgia in association with psoriatic arthritis recurrent psoriasis osteoarthritis is improved significantly with leflunomide that she has tolerated.  Will stay on the current dose.  Avoid nonsteroidals in view of the drop in the GFR.  Acetaminophen will be the choice as needed.  Will meet in 3 months with labs prior.    Follow up in 3 months.    HPI    Ceci Garcia is a 73 year old female is here for follow-up of psoriatic arthritis, osteoarthritis leading to polyarthralgias she has a positive TAMIKO.  She has noted improvement in both her joint symptoms and her psoriasis with leflunomide that she has tolerated nice lady.  Recent labs are within acceptable range.  Drop in GFR noted.  She is able to do her day-to-day activities without difficulty for example the other day she was able to make the  potato salad without impairment.  She does have positive TAMIKO without evidence of active connective tissue disease. She has noted no rash on her face, there is no stomatitis, she does not have photosensitivity, she has not had pleuritic symptoms, DVT PE, seizure disorder, renal impairment, as her Raynaud's features. She has had 8 pregnancies to normal worse,  rest were ectopic pregnancies. She has had polyarthralgias however she has osteoarthritis. She does not have signal suggestive of inflammatory joint disease. [facility] the initial positive TAMIKO which ranged from as low as 1: 82 as high as 1: 1280 also including last week C3-C4 LUIS M's, dsDNA all of which were normal. Previously here his anti-CCP antibody and rheumatoid factor were done and those were also normal as was the rheumatoid factor during the above-noted lab. Her SCL 70 was also negative. She has not had difficulty swallowing. She describes no breathing difficulty.  Her recent workup for antiphospholipid syndrome reviewed none of the antibodies are positive.      DETAILED EXAMINATION  06/25/24  :    Vitals:    06/25/24 0902   BP: 120/64   BP Location: Right arm   Patient Position: Sitting   Cuff Size: Adult Regular   Pulse: 96   Weight: 60.8 kg (134 lb)     Alert oriented. Head including the face is examined for malar rash, heliotropes, scarring, lupus pernio. Eyes examined for redness such as in episcleritis/scleritis, periorbital lesions.   Neck/ Face examined for parotid gland swelling, range of motion of neck.  Left upper and lower and right upper and lower extremities examined for tenderness, swelling, warmth of the appendicular joints, range of motion, edema, rash.  Some of the important findings included: she does not have evidence of synovitis in the palpable joints of the upper extremities.  No significant deformities of the digits.  + Heberden nodes.  Range of motion of the shoulders  show full abduction.  No JLT effusion or warmth of the knees.  she does not have dactylitis of the digits.     Patient Active Problem List    Diagnosis Date Noted    TAMIKO positive 02/08/2024     Priority: Medium    Rash of both hands 02/08/2024     Priority: Medium    History of psoriatic arthritis 02/08/2024     Priority: Medium    Closed compression fracture of body of L1 vertebra (H)  2021     Priority: Medium    Type 2 diabetes mellitus without complication (H) 2021     Priority: Medium    Hyperlipidemia 2021     Priority: Medium    High risk medication use 2020     Priority: Medium    PSA (psoriatic arthritis) (H) 2019     Priority: Medium    Psoriasis 2019     Priority: Medium    Localized primary osteoarthritis of both hands 2019     Priority: Medium     Past Surgical History:   Procedure Laterality Date    BIOPSY BREAST      BREAST CYST ASPIRATION      BREAST CYST EXCISION       SECTION      ECTOPIC PREGNANCY SURGERY      KIDNEY SURGERY        Past Medical History:   Diagnosis Date    Diabetes mellitus (H)     Hyperlipidemia      Allergies   Allergen Reactions    Ciprofloxacin Anaphylaxis, Hives, Shortness Of Breath and Swelling    Nirmatrelvir-Ritonavir Nausea and Diarrhea     Paxlovid    Oxycodone-Aspirin      Other reaction(s): breathing issues    Oxycodone-Acetaminophen Rash     Current Outpatient Medications   Medication Sig Dispense Refill    acetaminophen (TYLENOL) 325 MG tablet [ACETAMINOPHEN (TYLENOL) 325 MG TABLET] Take 325 mg by mouth every 6 (six) hours as needed for pain.      aspirin 81 MG EC tablet [ASPIRIN 81 MG EC TABLET] Take 81 mg by mouth daily.       b complex vitamins capsule [B COMPLEX VITAMINS CAPSULE] Take 1 capsule by mouth daily.       CALCIUM ORAL [CALCIUM ORAL] Take 1 tablet by mouth daily.       cholecalciferol, vitamin D3, (VITAMIN D3) 5,000 unit Tab [CHOLECALCIFEROL, VITAMIN D3, (VITAMIN D3) 5,000 UNIT TAB] Take 5,000 Units by mouth daily.       citalopram (CELEXA) 20 MG tablet 1-2 tab(s)      clobetasol (TEMOVATE) 0.05 % external ointment       cyanocobalamin, vitamin B-12, (VITAMIN B-12 ORAL) [CYANOCOBALAMIN, VITAMIN B-12, (VITAMIN B-12 ORAL)] Take 5,000 mcg by mouth daily.      diazepam (VALIUM) 5 MG tablet Take 1 tablet (5 mg) by mouth every 6 hours as needed for muscle spasms or pain 20 tablet 0     estradiol (ESTRACE) 0.01 % (0.1 mg/gram) vaginal cream [ESTRADIOL (ESTRACE) 0.01 % (0.1 MG/GRAM) VAGINAL CREAM] Insert 1 g into the vagina every other day.       folic acid (FOLVITE) 1 MG tablet 1 tab(s) orally once a day on every day but Friday      glipiZIDE (GLUCOTROL) 10 MG 24 hr tablet [GLIPIZIDE (GLUCOTROL) 10 MG 24 HR TABLET] Take 10 mg by mouth daily.             insulin glargine (LANTUS SOLOSTAR) 100 unit/mL (3 mL) pen [INSULIN GLARGINE (LANTUS SOLOSTAR) 100 UNIT/ML (3 ML) PEN] Inject 19 Units under the skin at bedtime.       leflunomide (ARAVA) 10 MG tablet TAKE 1 TABLET (10 MG) BY MOUTH DAILY 30 tablet 0    lidocaine (XYLOCAINE) 5 % ointment [LIDOCAINE (XYLOCAINE) 5 % OINTMENT] Apply to posterior neck 2 gms 4 times/day 150 g 0    LORazepam (ATIVAN) 0.5 MG tablet [LORAZEPAM (ATIVAN) 0.5 MG TABLET] Take 0.5 mg by mouth at bedtime.       metFORMIN (GLUCOPHAGE-XR) 500 MG 24 hr tablet [METFORMIN (GLUCOPHAGE-XR) 500 MG 24 HR TABLET] Take 500 mg by mouth daily with breakfast.       ondansetron (ZOFRAN-ODT) 8 MG ODT tab Take 1 tablet (8 mg) by mouth every 8 hours as needed for nausea 12 tablet 0    simvastatin (ZOCOR) 20 MG tablet [SIMVASTATIN (ZOCOR) 20 MG TABLET] Take 20 mg by mouth at bedtime.              family history includes Dementia in her mother; Heart Disease in her father; Hypertension in her mother; No Known Problems in her daughter.  Social Connections: Not on file          WBC Count   Date Value Ref Range Status   06/21/2024 6.0 4.0 - 11.0 10e3/uL Final     RBC Count   Date Value Ref Range Status   06/21/2024 3.98 3.80 - 5.20 10e6/uL Final     Hemoglobin   Date Value Ref Range Status   06/21/2024 11.9 11.7 - 15.7 g/dL Final     Hematocrit   Date Value Ref Range Status   06/21/2024 37.4 35.0 - 47.0 % Final     MCV   Date Value Ref Range Status   06/21/2024 94 78 - 100 fL Final     MCH   Date Value Ref Range Status   06/21/2024 29.9 26.5 - 33.0 pg Final     Platelet Count   Date Value Ref Range  Status   06/21/2024 223 150 - 450 10e3/uL Final     % Lymphocytes   Date Value Ref Range Status   06/11/2019 16 (L) 20 - 40 % Final     AST   Date Value Ref Range Status   04/01/2024 16 0 - 45 U/L Final     Comment:     Reference intervals for this test were updated on 6/12/2023 to more accurately reflect our healthy population. There may be differences in the flagging of prior results with similar values performed with this method. Interpretation of those prior results can be made in the context of the updated reference intervals.     ALT   Date Value Ref Range Status   06/21/2024 <5 0 - 50 U/L Final     Albumin   Date Value Ref Range Status   06/21/2024 3.7 3.5 - 5.2 g/dL Final   06/15/2022 3.7 3.5 - 5.0 g/dL Final     Alkaline Phosphatase   Date Value Ref Range Status   04/01/2024 56 40 - 150 U/L Final     Comment:     Reference intervals for this test were updated on 11/14/2023 to more accurately reflect our healthy population. There may be differences in the flagging of prior results with similar values performed with this method. Interpretation of those prior results can be made in the context of the updated reference intervals.     Creatinine   Date Value Ref Range Status   06/21/2024 0.99 (H) 0.51 - 0.95 mg/dL Final     GFR Estimate   Date Value Ref Range Status   06/21/2024 59 (L) >60 mL/min/1.73m2 Final     Comment:     eGFR calculated using 2021 CKD-EPI equation.   06/11/2021 >60 >60 mL/min/1.73m2 Final     GFR Estimate If Black   Date Value Ref Range Status   06/11/2021 >60 >60 mL/min/1.73m2 Final     Creatinine Urine mg/dL   Date Value Ref Range Status   04/01/2024 54.0 mg/dL Final     Comment:     The reference ranges have not been established in urine creatinine. The results should be integrated into the clinical context for interpretation.     Erythrocyte Sedimentation Rate   Date Value Ref Range Status   11/08/2018 12 0 - 20 mm/hr Final     CRP   Date Value Ref Range Status   11/08/2018 1.1 (H) 0.0  - 0.8 mg/dL Final

## 2024-07-02 ENCOUNTER — TRANSFERRED RECORDS (OUTPATIENT)
Dept: HEALTH INFORMATION MANAGEMENT | Facility: CLINIC | Age: 74
End: 2024-07-02

## 2024-09-24 ENCOUNTER — LAB (OUTPATIENT)
Dept: LAB | Facility: CLINIC | Age: 74
End: 2024-09-24
Payer: COMMERCIAL

## 2024-09-24 DIAGNOSIS — L40.50 PSA (PSORIATIC ARTHRITIS) (H): ICD-10-CM

## 2024-09-24 DIAGNOSIS — Z79.899 HIGH RISK MEDICATION USE: ICD-10-CM

## 2024-09-24 LAB
ALBUMIN SERPL BCG-MCNC: 3.8 G/DL (ref 3.5–5.2)
ALT SERPL W P-5'-P-CCNC: 9 U/L (ref 0–50)
CREAT SERPL-MCNC: 0.96 MG/DL (ref 0.51–0.95)
EGFRCR SERPLBLD CKD-EPI 2021: 61 ML/MIN/1.73M2
ERYTHROCYTE [DISTWIDTH] IN BLOOD BY AUTOMATED COUNT: 13.6 % (ref 10–15)
HCT VFR BLD AUTO: 35.8 % (ref 35–47)
HGB BLD-MCNC: 11.4 G/DL (ref 11.7–15.7)
MCH RBC QN AUTO: 29.8 PG (ref 26.5–33)
MCHC RBC AUTO-ENTMCNC: 31.8 G/DL (ref 31.5–36.5)
MCV RBC AUTO: 94 FL (ref 78–100)
PLATELET # BLD AUTO: 188 10E3/UL (ref 150–450)
RBC # BLD AUTO: 3.82 10E6/UL (ref 3.8–5.2)
WBC # BLD AUTO: 4.6 10E3/UL (ref 4–11)

## 2024-09-24 PROCEDURE — 36415 COLL VENOUS BLD VENIPUNCTURE: CPT

## 2024-09-24 PROCEDURE — 84460 ALANINE AMINO (ALT) (SGPT): CPT

## 2024-09-24 PROCEDURE — 82040 ASSAY OF SERUM ALBUMIN: CPT

## 2024-09-24 PROCEDURE — 82565 ASSAY OF CREATININE: CPT

## 2024-09-24 PROCEDURE — 85027 COMPLETE CBC AUTOMATED: CPT

## 2024-09-25 ENCOUNTER — OFFICE VISIT (OUTPATIENT)
Dept: RHEUMATOLOGY | Facility: CLINIC | Age: 74
End: 2024-09-25
Payer: COMMERCIAL

## 2024-09-25 VITALS
BODY MASS INDEX: 23.65 KG/M2 | DIASTOLIC BLOOD PRESSURE: 70 MMHG | WEIGHT: 133.5 LBS | OXYGEN SATURATION: 96 % | SYSTOLIC BLOOD PRESSURE: 116 MMHG | HEART RATE: 88 BPM

## 2024-09-25 DIAGNOSIS — L40.9 PSORIASIS: ICD-10-CM

## 2024-09-25 DIAGNOSIS — L40.50 PSA (PSORIATIC ARTHRITIS) (H): Primary | ICD-10-CM

## 2024-09-25 DIAGNOSIS — M19.042 LOCALIZED PRIMARY OSTEOARTHRITIS OF BOTH HANDS: ICD-10-CM

## 2024-09-25 DIAGNOSIS — M19.041 LOCALIZED PRIMARY OSTEOARTHRITIS OF BOTH HANDS: ICD-10-CM

## 2024-09-25 DIAGNOSIS — Z79.899 HIGH RISK MEDICATION USE: ICD-10-CM

## 2024-09-25 DIAGNOSIS — R76.8 ANA POSITIVE: ICD-10-CM

## 2024-09-25 PROCEDURE — 99214 OFFICE O/P EST MOD 30 MIN: CPT | Performed by: INTERNAL MEDICINE

## 2024-09-25 PROCEDURE — G2211 COMPLEX E/M VISIT ADD ON: HCPCS | Performed by: INTERNAL MEDICINE

## 2024-09-25 RX ORDER — LEFLUNOMIDE 20 MG/1
20 TABLET ORAL DAILY
Qty: 90 TABLET | Refills: 0 | Status: SHIPPED | OUTPATIENT
Start: 2024-09-25 | End: 2024-12-24

## 2024-09-25 NOTE — PROGRESS NOTES
I think this is      Rheumatology follow-up visit note     Ceci is a 74 year old female presents today for follow-up.    Ceci was seen today for recheck.    Diagnoses and all orders for this visit:    PSA (psoriatic arthritis) (H)  -     leflunomide (ARAVA) 20 MG tablet; Take 1 tablet (20 mg) by mouth daily.    Localized primary osteoarthritis of both hands    High risk medication use    TAMIKO positive    Psoriasis  -     leflunomide (ARAVA) 20 MG tablet; Take 1 tablet (20 mg) by mouth daily.         This patient's polyarthralgia combination of psoriatic arthritis, osteoarthritis, with increased pain in the right hand she would like to try higher dose of leflunomide.  We discussed recent increased activity might be a contributor.  She will check labs every 6 weeks will meet her in 3 months.  The longitudinal plan of care for the diagnosis(es)/condition(s) as documented were addressed during this visit. Due to the added complexity in care, I will continue to support Ceci in the subsequent management and with ongoing continuity of care.      Follow up in 3 months.    HPI    Ceci Garcia is a 74 year old female is here for follow-up of   psoriatic arthritis, osteoarthritis leading to polyarthralgias she has a positive TAMIKO.  She has noted improvement in both her joint symptoms and her psoriasis with leflunomide that she has tolerated well.  Recent labs are within acceptable range.  Drop in Hgb noted.  Recently she has noted more discomfort in her right side, especially the ring finger, pointing to the PIP.  She has noted arthralgias at a pain level of 2.5 out of 10.  She has been cooking more, her daughters mother-in-law has recently undergone the surgery.  She is helping them out.  She would drop a cup of water on her nightstand if she would not to hold it properly as she puts it because of the right hand pain.   .  She does have positive TAMIKO without evidence of active connective tissue disease. She has noted no rash on  her face, there is no stomatitis, she does not have photosensitivity, she has not had pleuritic symptoms, DVT PE, seizure disorder, renal impairment, as her Raynaud's features. She has had 8 pregnancies to normal worse, rest were ectopic pregnancies. She has had polyarthralgias however she has osteoarthritis. She does not have signal suggestive of inflammatory joint disease. The workup at [facility] dermatology after the initial positive TAMIKO which ranged from as low as 1: 82 as high as 1: 1280 also including last week C3-C4 LUIS M's, dsDNA all of which were normal. Previously here his anti-CCP antibody and rheumatoid factor were done and those were also normal as was the rheumatoid factor during the above-noted lab. Her SCL 70 was also negative. She has not had difficulty swallowing. She describes no breathing difficulty.  Her recent workup for antiphospholipid syndrome reviewed none of the antibodies are positive.           DETAILED EXAMINATION  09/25/24  :    Vitals:    09/25/24 1019   BP: 116/70   BP Location: Right arm   Pulse: 88   SpO2: 96%   Weight: 60.6 kg (133 lb 8 oz)   She has Heberden's, some of the Mehdi's are painful especially the right ring finger.  There is no tenderness along the flexor tendon sheaths of any of the digits.  She does not have locking or clicking.  Alert oriented. Head including the face is examined for malar rash, heliotropes, scarring, lupus pernio. Eyes examined for redness such as in episcleritis/scleritis, periorbital lesions.   Neck/ Face examined for parotid gland swelling, range of motion of neck.  Left upper and lower and right upper and lower extremities examined for tenderness, swelling, warmth of the appendicular joints, range of motion, edema, rash.  Some of the important findings included:  There is no dactylitis of digits no triggering.  She has tender  Mehdi's such as a right ring finger.    Her gait is normal, knees without warmth or effusion.     Patient Active  Problem List    Diagnosis Date Noted    TAMIKO positive 2024     Priority: Medium    Rash of both hands 2024     Priority: Medium    History of psoriatic arthritis 2024     Priority: Medium    Closed compression fracture of body of L1 vertebra (H) 2021     Priority: Medium    Type 2 diabetes mellitus without complication (H) 2021     Priority: Medium    Hyperlipidemia 2021     Priority: Medium    High risk medication use 2020     Priority: Medium    PSA (psoriatic arthritis) (H) 2019     Priority: Medium    Psoriasis 2019     Priority: Medium    Localized primary osteoarthritis of both hands 2019     Priority: Medium     Past Surgical History:   Procedure Laterality Date    BIOPSY BREAST      BREAST CYST ASPIRATION      BREAST CYST EXCISION       SECTION      ECTOPIC PREGNANCY SURGERY      KIDNEY SURGERY        Past Medical History:   Diagnosis Date    Diabetes mellitus (H)     Hyperlipidemia      Allergies   Allergen Reactions    Ciprofloxacin Anaphylaxis, Hives, Shortness Of Breath and Swelling    Nirmatrelvir-Ritonavir Nausea and Diarrhea     Paxlovid    Oxycodone-Aspirin      Other reaction(s): breathing issues    Oxycodone-Acetaminophen Rash     Current Outpatient Medications   Medication Sig Dispense Refill    acetaminophen (TYLENOL) 325 MG tablet [ACETAMINOPHEN (TYLENOL) 325 MG TABLET] Take 325 mg by mouth every 6 (six) hours as needed for pain.      aspirin 81 MG EC tablet [ASPIRIN 81 MG EC TABLET] Take 81 mg by mouth daily.       b complex vitamins capsule [B COMPLEX VITAMINS CAPSULE] Take 1 capsule by mouth daily.       CALCIUM ORAL [CALCIUM ORAL] Take 1 tablet by mouth daily.       cholecalciferol, vitamin D3, (VITAMIN D3) 5,000 unit Tab [CHOLECALCIFEROL, VITAMIN D3, (VITAMIN D3) 5,000 UNIT TAB] Take 5,000 Units by mouth daily.       citalopram (CELEXA) 20 MG tablet 1-2 tab(s)      clobetasol (TEMOVATE) 0.05 % external ointment        cyanocobalamin, vitamin B-12, (VITAMIN B-12 ORAL) [CYANOCOBALAMIN, VITAMIN B-12, (VITAMIN B-12 ORAL)] Take 5,000 mcg by mouth daily.      diazepam (VALIUM) 5 MG tablet Take 1 tablet (5 mg) by mouth every 6 hours as needed for muscle spasms or pain 20 tablet 0    estradiol (ESTRACE) 0.01 % (0.1 mg/gram) vaginal cream [ESTRADIOL (ESTRACE) 0.01 % (0.1 MG/GRAM) VAGINAL CREAM] Insert 1 g into the vagina every other day.       folic acid (FOLVITE) 1 MG tablet 1 tab(s) orally once a day on every day but Friday      glipiZIDE (GLUCOTROL) 10 MG 24 hr tablet [GLIPIZIDE (GLUCOTROL) 10 MG 24 HR TABLET] Take 10 mg by mouth daily.             insulin glargine (LANTUS SOLOSTAR) 100 unit/mL (3 mL) pen [INSULIN GLARGINE (LANTUS SOLOSTAR) 100 UNIT/ML (3 ML) PEN] Inject 19 Units under the skin at bedtime.       leflunomide (ARAVA) 10 MG tablet Take 1 tablet (10 mg) by mouth daily for 90 days 90 tablet 0    lidocaine (XYLOCAINE) 5 % ointment [LIDOCAINE (XYLOCAINE) 5 % OINTMENT] Apply to posterior neck 2 gms 4 times/day 150 g 0    LORazepam (ATIVAN) 0.5 MG tablet [LORAZEPAM (ATIVAN) 0.5 MG TABLET] Take 0.5 mg by mouth at bedtime.       metFORMIN (GLUCOPHAGE-XR) 500 MG 24 hr tablet [METFORMIN (GLUCOPHAGE-XR) 500 MG 24 HR TABLET] Take 500 mg by mouth daily with breakfast.       ondansetron (ZOFRAN-ODT) 8 MG ODT tab Take 1 tablet (8 mg) by mouth every 8 hours as needed for nausea 12 tablet 0    simvastatin (ZOCOR) 20 MG tablet [SIMVASTATIN (ZOCOR) 20 MG TABLET] Take 20 mg by mouth at bedtime.              family history includes Dementia in her mother; Heart Disease in her father; Hypertension in her mother; No Known Problems in her daughter.  Social Connections: Not on file          WBC Count   Date Value Ref Range Status   09/24/2024 4.6 4.0 - 11.0 10e3/uL Final     RBC Count   Date Value Ref Range Status   09/24/2024 3.82 3.80 - 5.20 10e6/uL Final     Hemoglobin   Date Value Ref Range Status   09/24/2024 11.4 (L) 11.7 - 15.7 g/dL Final      Hematocrit   Date Value Ref Range Status   09/24/2024 35.8 35.0 - 47.0 % Final     MCV   Date Value Ref Range Status   09/24/2024 94 78 - 100 fL Final     MCH   Date Value Ref Range Status   09/24/2024 29.8 26.5 - 33.0 pg Final     Platelet Count   Date Value Ref Range Status   09/24/2024 188 150 - 450 10e3/uL Final     % Lymphocytes   Date Value Ref Range Status   06/11/2019 16 (L) 20 - 40 % Final     AST   Date Value Ref Range Status   04/01/2024 16 0 - 45 U/L Final     Comment:     Reference intervals for this test were updated on 6/12/2023 to more accurately reflect our healthy population. There may be differences in the flagging of prior results with similar values performed with this method. Interpretation of those prior results can be made in the context of the updated reference intervals.     ALT   Date Value Ref Range Status   09/24/2024 9 0 - 50 U/L Final     Albumin   Date Value Ref Range Status   09/24/2024 3.8 3.5 - 5.2 g/dL Final   06/15/2022 3.7 3.5 - 5.0 g/dL Final     Alkaline Phosphatase   Date Value Ref Range Status   04/01/2024 56 40 - 150 U/L Final     Comment:     Reference intervals for this test were updated on 11/14/2023 to more accurately reflect our healthy population. There may be differences in the flagging of prior results with similar values performed with this method. Interpretation of those prior results can be made in the context of the updated reference intervals.     Creatinine   Date Value Ref Range Status   09/24/2024 0.96 (H) 0.51 - 0.95 mg/dL Final     GFR Estimate   Date Value Ref Range Status   09/24/2024 61 >60 mL/min/1.73m2 Final     Comment:     eGFR calculated using 2021 CKD-EPI equation.   06/11/2021 >60 >60 mL/min/1.73m2 Final     GFR Estimate If Black   Date Value Ref Range Status   06/11/2021 >60 >60 mL/min/1.73m2 Final     Creatinine Urine mg/dL   Date Value Ref Range Status   04/01/2024 54.0 mg/dL Final     Comment:     The reference ranges have not been  established in urine creatinine. The results should be integrated into the clinical context for interpretation.     Erythrocyte Sedimentation Rate   Date Value Ref Range Status   11/08/2018 12 0 - 20 mm/hr Final     CRP   Date Value Ref Range Status   11/08/2018 1.1 (H) 0.0 - 0.8 mg/dL Final

## 2024-09-26 ENCOUNTER — TELEPHONE (OUTPATIENT)
Dept: RHEUMATOLOGY | Facility: CLINIC | Age: 74
End: 2024-09-26
Payer: COMMERCIAL

## 2024-09-26 NOTE — TELEPHONE ENCOUNTER
Last OV 9/25  FV 1/8    PSA (psoriatic arthritis) (H)     Localized primary osteoarthritis of both hands     High risk medication use     TAMIKO positive     Psoriasis    Labs WNR

## 2024-09-26 NOTE — TELEPHONE ENCOUNTER
[facility] Call Center    Phone Message    May a detailed message be left on voicemail: yes     Reason for Call: Pt met with Dr. Puente yesterday (9/25/24) and her Leflunomide was increased at that time. However, she spoke to her pharmacy, and they felt it would be good for her to check with him regarding the fact that the Leflunomide has always caused GI issues for her (mainly diarrhea), so the concern is that increasing that medication would make those GI issues worse. Pt would like Dr. Puente's opinion on this?     Action Taken: Other: Rheum    Travel Screening: Not Applicable

## 2024-09-26 NOTE — TELEPHONE ENCOUNTER
Spoke with MD. He stated its ok for pt to increase the medication dose as talked about in visit on 9/25 as right now we are in a trial dose. If pt has increased GI issues, MD will come up with a new plan and potentially decrease dose.     LVM to pt to call back to inform of this information.

## 2024-10-08 ENCOUNTER — TRANSFERRED RECORDS (OUTPATIENT)
Dept: HEALTH INFORMATION MANAGEMENT | Facility: CLINIC | Age: 74
End: 2024-10-08
Payer: COMMERCIAL

## 2024-10-08 ENCOUNTER — TELEPHONE (OUTPATIENT)
Dept: RHEUMATOLOGY | Facility: CLINIC | Age: 74
End: 2024-10-08
Payer: COMMERCIAL

## 2024-10-08 NOTE — TELEPHONE ENCOUNTER
[facility] Call Center    Phone Message    May a detailed message be left on voicemail: yes     Reason for Call: Pt states that her DERM provider- Dr. Ng suggested she resumes methotrexate on a low dose. She states Dr. Ng will be forwarding this info to our clinic in a email. Pt is wondering if Dr. Puente agrees with the plan, if so, she is requesting him to send methotrexate rx to [facility]/pharmacy #[id number] - [place] [address]  P: [phone]  F: [phone]    Action Taken: Other: RHEUM    Travel Screening: Not Applicable     Date of Service:

## 2024-10-09 ENCOUNTER — TELEPHONE (OUTPATIENT)
Dept: RHEUMATOLOGY | Facility: CLINIC | Age: 74
End: 2024-10-09
Payer: COMMERCIAL

## 2024-10-09 DIAGNOSIS — L40.50 PSA (PSORIATIC ARTHRITIS) (H): Primary | ICD-10-CM

## 2024-10-09 NOTE — TELEPHONE ENCOUNTER
[facility] Call [facility]    Phone Message    May a detailed message be left on voicemail: yes     Reason for Call: Other: please contact Ceci she is needing a refill on her methotrexate she wants confirmation that her medical records and been received, and also if Dr. Puente received an electronic message from Dr.Tareen silva. Thank you     Action Taken: Other: Rheum    Travel Screening: Not Applicable     Date of Service:

## 2024-10-11 NOTE — TELEPHONE ENCOUNTER
Called and spoke with pt regarding below. Still havent received records, called [facility] to send these records to us.

## 2024-10-15 NOTE — TELEPHONE ENCOUNTER
The patient was calling to speak with the care team about getting back on methotrexate per her dermatologist Dr. Ng and the PCP Basia Elmore, please review and follow up with patient to discuss further thank you.

## 2024-10-15 NOTE — TELEPHONE ENCOUNTER
Still waiting on pt records. Called [facility] and they said pt has to sign ÓSCAR with them before they can send records. Called pt back to inform her of this and she was planning on going in to sign the ÓSCAR.

## 2024-10-16 NOTE — TELEPHONE ENCOUNTER
Spoke with pt regarding below. Informed pt MD is out of country until next week and will discuss with MD upon return.

## 2024-10-16 NOTE — TELEPHONE ENCOUNTER
Called and spoke to patient regarding below. Let pt know dr rodriguez is out of country right now and we will be able to discuss this with him at his return. Pt understood.

## 2024-10-22 RX ORDER — METHOTREXATE 2.5 MG/1
7.5 TABLET ORAL
Qty: 36 TABLET | Refills: 0 | Status: SHIPPED | OUTPATIENT
Start: 2024-10-22

## 2024-10-22 NOTE — TELEPHONE ENCOUNTER
Ok per Dr. Puente to start back on low dose (7.5 mg by mouth once a week) of methotrexate as cleared by dermatologist. Discontinue leflunomide.       Rx refilled per Rheum RN refill protocol.

## 2024-10-22 NOTE — TELEPHONE ENCOUNTER
LOV: 9/25/24  FOV: 1/8/25  Labs: 9/24/24 WNL    SA (psoriatic arthritis) (H)    Localized primary osteoarthritis of both hands     High risk medication use     TAMIKO positive     Psoriasis      This patient's polyarthralgia combination of psoriatic arthritis, osteoarthritis, with increased pain in the right hand she would like to try higher dose of leflunomide.  We discussed recent increased activity might be a contributor.  She will check labs every 6 weeks will meet her in 3 months.  The longitudinal plan of care for the diagnosis(es)/condition(s) as documented were addressed during this visit. Due to the added complexity in care, I will continue to support Ceic in the subsequent management and with ongoing continuity of care.        Follow up in 3 months.    Pt got the ok to start on methotrexate again from dermatologist. Ok to start again on low dose?

## 2024-11-06 ENCOUNTER — LAB (OUTPATIENT)
Dept: LAB | Facility: CLINIC | Age: 74
End: 2024-11-06
Payer: COMMERCIAL

## 2024-11-06 DIAGNOSIS — L40.50 PSA (PSORIATIC ARTHRITIS) (H): ICD-10-CM

## 2024-11-06 DIAGNOSIS — Z79.899 HIGH RISK MEDICATION USE: ICD-10-CM

## 2024-11-06 LAB
ALBUMIN SERPL BCG-MCNC: 3.8 G/DL (ref 3.5–5.2)
ALT SERPL W P-5'-P-CCNC: 54 U/L (ref 0–50)
CREAT SERPL-MCNC: 0.95 MG/DL (ref 0.51–0.95)
EGFRCR SERPLBLD CKD-EPI 2021: 62 ML/MIN/1.73M2
ERYTHROCYTE [DISTWIDTH] IN BLOOD BY AUTOMATED COUNT: 13.1 % (ref 10–15)
HCT VFR BLD AUTO: 38.1 % (ref 35–47)
HGB BLD-MCNC: 12.1 G/DL (ref 11.7–15.7)
MCH RBC QN AUTO: 29.7 PG (ref 26.5–33)
MCHC RBC AUTO-ENTMCNC: 31.8 G/DL (ref 31.5–36.5)
MCV RBC AUTO: 94 FL (ref 78–100)
PLATELET # BLD AUTO: 205 10E3/UL (ref 150–450)
RBC # BLD AUTO: 4.07 10E6/UL (ref 3.8–5.2)
WBC # BLD AUTO: 4.7 10E3/UL (ref 4–11)

## 2024-11-06 PROCEDURE — 85027 COMPLETE CBC AUTOMATED: CPT

## 2024-11-06 PROCEDURE — 36415 COLL VENOUS BLD VENIPUNCTURE: CPT

## 2024-11-06 PROCEDURE — 82040 ASSAY OF SERUM ALBUMIN: CPT

## 2024-11-06 PROCEDURE — 84460 ALANINE AMINO (ALT) (SGPT): CPT

## 2024-11-06 PROCEDURE — 82565 ASSAY OF CREATININE: CPT

## 2025-01-06 ENCOUNTER — LAB (OUTPATIENT)
Dept: LAB | Facility: CLINIC | Age: 75
End: 2025-01-06
Payer: COMMERCIAL

## 2025-01-06 DIAGNOSIS — Z79.899 HIGH RISK MEDICATION USE: ICD-10-CM

## 2025-01-06 DIAGNOSIS — L40.50 PSA (PSORIATIC ARTHRITIS) (H): ICD-10-CM

## 2025-01-06 LAB
ALBUMIN SERPL BCG-MCNC: 4 G/DL (ref 3.5–5.2)
ALT SERPL W P-5'-P-CCNC: 63 U/L (ref 0–50)
CREAT SERPL-MCNC: 0.96 MG/DL (ref 0.51–0.95)
EGFRCR SERPLBLD CKD-EPI 2021: 61 ML/MIN/1.73M2
ERYTHROCYTE [DISTWIDTH] IN BLOOD BY AUTOMATED COUNT: 16 % (ref 10–15)
HCT VFR BLD AUTO: 35.7 % (ref 35–47)
HGB BLD-MCNC: 11.3 G/DL (ref 11.7–15.7)
MCH RBC QN AUTO: 31.6 PG (ref 26.5–33)
MCHC RBC AUTO-ENTMCNC: 31.7 G/DL (ref 31.5–36.5)
MCV RBC AUTO: 100 FL (ref 78–100)
PLATELET # BLD AUTO: 229 10E3/UL (ref 150–450)
RBC # BLD AUTO: 3.58 10E6/UL (ref 3.8–5.2)
WBC # BLD AUTO: 5.3 10E3/UL (ref 4–11)

## 2025-01-06 PROCEDURE — 36415 COLL VENOUS BLD VENIPUNCTURE: CPT

## 2025-01-06 PROCEDURE — 82565 ASSAY OF CREATININE: CPT

## 2025-01-06 PROCEDURE — 84460 ALANINE AMINO (ALT) (SGPT): CPT

## 2025-01-06 PROCEDURE — 85027 COMPLETE CBC AUTOMATED: CPT

## 2025-01-06 PROCEDURE — 82040 ASSAY OF SERUM ALBUMIN: CPT

## 2025-01-08 ENCOUNTER — OFFICE VISIT (OUTPATIENT)
Dept: RHEUMATOLOGY | Facility: CLINIC | Age: 75
End: 2025-01-08
Payer: COMMERCIAL

## 2025-01-08 VITALS
WEIGHT: 133.4 LBS | DIASTOLIC BLOOD PRESSURE: 62 MMHG | HEART RATE: 79 BPM | BODY MASS INDEX: 23.63 KG/M2 | OXYGEN SATURATION: 96 % | SYSTOLIC BLOOD PRESSURE: 128 MMHG

## 2025-01-08 DIAGNOSIS — M19.041 LOCALIZED PRIMARY OSTEOARTHRITIS OF BOTH HANDS: ICD-10-CM

## 2025-01-08 DIAGNOSIS — L40.9 PSORIASIS: ICD-10-CM

## 2025-01-08 DIAGNOSIS — Z79.899 HIGH RISK MEDICATION USE: ICD-10-CM

## 2025-01-08 DIAGNOSIS — R74.01 ALT (SGPT) LEVEL RAISED: ICD-10-CM

## 2025-01-08 DIAGNOSIS — R76.8 ANA POSITIVE: ICD-10-CM

## 2025-01-08 DIAGNOSIS — M19.042 LOCALIZED PRIMARY OSTEOARTHRITIS OF BOTH HANDS: ICD-10-CM

## 2025-01-08 DIAGNOSIS — L40.50 PSA (PSORIATIC ARTHRITIS) (H): Primary | ICD-10-CM

## 2025-01-08 PROCEDURE — 99214 OFFICE O/P EST MOD 30 MIN: CPT | Performed by: INTERNAL MEDICINE

## 2025-01-08 PROCEDURE — G2211 COMPLEX E/M VISIT ADD ON: HCPCS | Performed by: INTERNAL MEDICINE

## 2025-01-08 RX ORDER — FOLIC ACID 1 MG/1
3 TABLET ORAL DAILY
COMMUNITY
Start: 2025-01-08 | End: 2025-04-08

## 2025-01-08 RX ORDER — METHOTREXATE 2.5 MG/1
7.5 TABLET ORAL
Qty: 36 TABLET | Refills: 0 | Status: SHIPPED | OUTPATIENT
Start: 2025-01-08

## 2025-01-08 NOTE — PROGRESS NOTES
Rheumatology follow-up visit note     Ceci is a 74 year old female presents today for follow-up.    Ceci was seen today for recheck.    Diagnoses and all orders for this visit:    PSA (psoriatic arthritis) (H)  -     methotrexate 2.5 MG tablet; Take 3 tablets (7.5 mg) by mouth every 7 days.    Localized primary osteoarthritis of both hands    High risk medication use  -     folic acid (FOLVITE) 1 MG tablet; Take 3 tablets (3 mg) by mouth daily.    TAMIKO positive    Psoriasis    ALT (SGPT) level raised        The longitudinal plan of care for the diagnosis(es)/condition(s) as documented were addressed during this visit. Due to the added complexity in care, I will continue to support Ceci in the subsequent management and with ongoing continuity of care.  She has not able to tolerate leflunomide continue methotrexate mild elevation of ALT, she cannot increase the folic acid as noted.    Follow up in 3 months.  Labs prior.    HPI    Ceci Garcia is a 74 year old female is here for follow-up of   psoriatic arthritis, osteoarthritis leading to polyarthralgias she has a positive TAMIKO.  She has noted improvement in both her joint symptoms and her psoriasis with leflunomide however her dermatologist wanted her to try methotrexate instead that was reinitiated, she has mild elevation of ALT.  She takes no more than 2 drinks of wine per week.  She is on 1 mg of folic acid that she takes over-the-counter.  She is sure it is 1 mg and not 400 mcg tablets.  Her psoriasis remains under good control with methotrexate.  She noted overall pain level is 0.5/10.  Able to do all day-to-day activities without difficulty.  There is no morning stiffness noted.      DETAILED EXAMINATION  01/08/25  :    Vitals:    01/08/25 1038   BP: 128/62   BP Location: Right arm   Patient Position: Sitting   Cuff Size: Adult Regular   Pulse: 79   SpO2: 96%   Weight: 60.5 kg (133 lb 6.4 oz)     Alert oriented. Head including the face is examined for  malar rash, heliotropes, scarring, lupus pernio. Eyes examined for redness such as in episcleritis/scleritis, periorbital lesions.   Neck/ Face examined for parotid gland swelling, range of motion of neck.  Left upper and lower and right upper and lower extremities examined for tenderness, swelling, warmth of the appendicular joints, range of motion, edema, rash.  Some of the important findings included: she does not have evidence of synovitis in the palpable joints of the upper extremities.  No significant deformities of the digits.  + Heberden nodes.  Range of motion of the shoulders  show full abduction.  No JLT effusion or warmth of the knees.  she does not have dactylitis of the digits.     Patient Active Problem List    Diagnosis Date Noted    TAMIKO positive 2024     Priority: Medium    Rash of both hands 2024     Priority: Medium    History of psoriatic arthritis 2024     Priority: Medium    Closed compression fracture of body of L1 vertebra (H) 2021     Priority: Medium    Type 2 diabetes mellitus without complication (H) 2021     Priority: Medium    Hyperlipidemia 2021     Priority: Medium    High risk medication use 2020     Priority: Medium    PSA (psoriatic arthritis) (H) 2019     Priority: Medium    Psoriasis 2019     Priority: Medium    Localized primary osteoarthritis of both hands 2019     Priority: Medium     Past Surgical History:   Procedure Laterality Date    BIOPSY BREAST      BREAST CYST ASPIRATION      BREAST CYST EXCISION       SECTION      ECTOPIC PREGNANCY SURGERY      KIDNEY SURGERY        Past Medical History:   Diagnosis Date    Diabetes mellitus (H)     Hyperlipidemia      Allergies   Allergen Reactions    Ciprofloxacin Anaphylaxis, Hives, Shortness Of Breath and Swelling    Nirmatrelvir-Ritonavir Nausea and Diarrhea     Paxlovid    Oxycodone-Aspirin      Other reaction(s): breathing issues    Oxycodone-Acetaminophen Rash      Current Outpatient Medications   Medication Sig Dispense Refill    acetaminophen (TYLENOL) 325 MG tablet [ACETAMINOPHEN (TYLENOL) 325 MG TABLET] Take 325 mg by mouth every 6 (six) hours as needed for pain.      aspirin 81 MG EC tablet [ASPIRIN 81 MG EC TABLET] Take 81 mg by mouth daily.       b complex vitamins capsule [B COMPLEX VITAMINS CAPSULE] Take 1 capsule by mouth daily.       CALCIUM ORAL [CALCIUM ORAL] Take 1 tablet by mouth daily.       cholecalciferol, vitamin D3, (VITAMIN D3) 5,000 unit Tab [CHOLECALCIFEROL, VITAMIN D3, (VITAMIN D3) 5,000 UNIT TAB] Take 5,000 Units by mouth daily.       citalopram (CELEXA) 20 MG tablet 1-2 tab(s)      clobetasol (TEMOVATE) 0.05 % external ointment       cyanocobalamin, vitamin B-12, (VITAMIN B-12 ORAL) [CYANOCOBALAMIN, VITAMIN B-12, (VITAMIN B-12 ORAL)] Take 5,000 mcg by mouth daily.      diazepam (VALIUM) 5 MG tablet Take 1 tablet (5 mg) by mouth every 6 hours as needed for muscle spasms or pain 20 tablet 0    estradiol (ESTRACE) 0.01 % (0.1 mg/gram) vaginal cream [ESTRADIOL (ESTRACE) 0.01 % (0.1 MG/GRAM) VAGINAL CREAM] Insert 1 g into the vagina every other day.       folic acid (FOLVITE) 1 MG tablet 1 tab(s) orally once a day on every day but Friday      glipiZIDE (GLUCOTROL) 10 MG 24 hr tablet [GLIPIZIDE (GLUCOTROL) 10 MG 24 HR TABLET] Take 10 mg by mouth daily.             insulin glargine (LANTUS SOLOSTAR) 100 unit/mL (3 mL) pen [INSULIN GLARGINE (LANTUS SOLOSTAR) 100 UNIT/ML (3 ML) PEN] Inject 19 Units under the skin at bedtime.       lidocaine (XYLOCAINE) 5 % ointment [LIDOCAINE (XYLOCAINE) 5 % OINTMENT] Apply to posterior neck 2 gms 4 times/day 150 g 0    LORazepam (ATIVAN) 0.5 MG tablet [LORAZEPAM (ATIVAN) 0.5 MG TABLET] Take 0.5 mg by mouth at bedtime.       metFORMIN (GLUCOPHAGE-XR) 500 MG 24 hr tablet [METFORMIN (GLUCOPHAGE-XR) 500 MG 24 HR TABLET] Take 500 mg by mouth daily with breakfast.       methotrexate 2.5 MG tablet Take 3 tablets (7.5 mg) by  mouth every 7 days. 36 tablet 0    ondansetron (ZOFRAN-ODT) 8 MG ODT tab Take 1 tablet (8 mg) by mouth every 8 hours as needed for nausea 12 tablet 0    simvastatin (ZOCOR) 20 MG tablet [SIMVASTATIN (ZOCOR) 20 MG TABLET] Take 20 mg by mouth at bedtime.              family history includes Dementia in her mother; Heart Disease in her father; Hypertension in her mother; No Known Problems in her daughter.  Social Connections: Not on file          WBC Count   Date Value Ref Range Status   01/06/2025 5.3 4.0 - 11.0 10e3/uL Final     RBC Count   Date Value Ref Range Status   01/06/2025 3.58 (L) 3.80 - 5.20 10e6/uL Final     Hemoglobin   Date Value Ref Range Status   01/06/2025 11.3 (L) 11.7 - 15.7 g/dL Final     Hematocrit   Date Value Ref Range Status   01/06/2025 35.7 35.0 - 47.0 % Final     MCV   Date Value Ref Range Status   01/06/2025 100 78 - 100 fL Final     MCH   Date Value Ref Range Status   01/06/2025 31.6 26.5 - 33.0 pg Final     Platelet Count   Date Value Ref Range Status   01/06/2025 229 150 - 450 10e3/uL Final     % Lymphocytes   Date Value Ref Range Status   06/11/2019 16 (L) 20 - 40 % Final     AST   Date Value Ref Range Status   04/01/2024 16 0 - 45 U/L Final     Comment:     Reference intervals for this test were updated on 6/12/2023 to more accurately reflect our healthy population. There may be differences in the flagging of prior results with similar values performed with this method. Interpretation of those prior results can be made in the context of the updated reference intervals.     ALT   Date Value Ref Range Status   01/06/2025 63 (H) 0 - 50 U/L Final     Albumin   Date Value Ref Range Status   01/06/2025 4.0 3.5 - 5.2 g/dL Final   06/15/2022 3.7 3.5 - 5.0 g/dL Final     Alkaline Phosphatase   Date Value Ref Range Status   04/01/2024 56 40 - 150 U/L Final     Comment:     Reference intervals for this test were updated on 11/14/2023 to more accurately reflect our healthy population. There may be  differences in the flagging of prior results with similar values performed with this method. Interpretation of those prior results can be made in the context of the updated reference intervals.     Creatinine   Date Value Ref Range Status   01/06/2025 0.96 (H) 0.51 - 0.95 mg/dL Final     GFR Estimate   Date Value Ref Range Status   01/06/2025 61 >60 mL/min/1.73m2 Final     Comment:     eGFR calculated using 2021 CKD-EPI equation.   06/11/2021 >60 >60 mL/min/1.73m2 Final     GFR Estimate If Black   Date Value Ref Range Status   06/11/2021 >60 >60 mL/min/1.73m2 Final     Creatinine Urine mg/dL   Date Value Ref Range Status   04/01/2024 54.0 mg/dL Final     Comment:     The reference ranges have not been established in urine creatinine. The results should be integrated into the clinical context for interpretation.     Erythrocyte Sedimentation Rate   Date Value Ref Range Status   11/08/2018 12 0 - 20 mm/hr Final     CRP   Date Value Ref Range Status   11/08/2018 1.1 (H) 0.0 - 0.8 mg/dL Final

## 2025-01-15 ENCOUNTER — TELEPHONE (OUTPATIENT)
Dept: RHEUMATOLOGY | Facility: CLINIC | Age: 75
End: 2025-01-15
Payer: COMMERCIAL

## 2025-01-15 NOTE — TELEPHONE ENCOUNTER
I called the pt and discussed.  
[facility] Call Center    Phone Message    May a detailed message be left on voicemail: yes     Reason for Call: Other: Pt is calling to speak to a nurse. Pt wants to verify the amount of folic acid she should be taking. Please call pt back at ph: [phone].      Action Taken: Message routed to:  Other: [facility]    Travel Screening: Not Applicable     Date of Service: 1/15    
General

## 2025-03-11 DIAGNOSIS — L40.50 PSA (PSORIATIC ARTHRITIS) (H): ICD-10-CM

## 2025-03-11 RX ORDER — METHOTREXATE 2.5 MG/1
7.5 TABLET ORAL
Qty: 36 TABLET | Refills: 0 | OUTPATIENT
Start: 2025-03-11

## 2025-03-24 ENCOUNTER — TRANSFERRED RECORDS (OUTPATIENT)
Dept: HEALTH INFORMATION MANAGEMENT | Facility: CLINIC | Age: 75
End: 2025-03-24

## 2025-03-24 ENCOUNTER — LAB REQUISITION (OUTPATIENT)
Dept: LAB | Facility: CLINIC | Age: 75
End: 2025-03-24

## 2025-03-24 DIAGNOSIS — E11.29 TYPE 2 DIABETES MELLITUS WITH OTHER DIABETIC KIDNEY COMPLICATION (H): ICD-10-CM

## 2025-03-24 LAB
ALBUMIN SERPL BCG-MCNC: 4 G/DL (ref 3.5–5.2)
ALP SERPL-CCNC: 56 U/L (ref 40–150)
ALT SERPL W P-5'-P-CCNC: 16 U/L (ref 0–50)
ANION GAP SERPL CALCULATED.3IONS-SCNC: 12 MMOL/L (ref 7–15)
AST SERPL W P-5'-P-CCNC: 20 U/L (ref 0–45)
BILIRUB SERPL-MCNC: 0.4 MG/DL
BUN SERPL-MCNC: 13 MG/DL (ref 8–23)
CALCIUM SERPL-MCNC: 9.7 MG/DL (ref 8.8–10.4)
CHLORIDE SERPL-SCNC: 100 MMOL/L (ref 98–107)
CHOLEST SERPL-MCNC: 188 MG/DL
CREAT SERPL-MCNC: 0.97 MG/DL (ref 0.51–0.95)
CREAT UR-MCNC: 96.1 MG/DL
EGFRCR SERPLBLD CKD-EPI 2021: 60 ML/MIN/1.73M2
FASTING STATUS PATIENT QL REPORTED: YES
FASTING STATUS PATIENT QL REPORTED: YES
GLUCOSE SERPL-MCNC: 209 MG/DL (ref 70–99)
HCO3 SERPL-SCNC: 26 MMOL/L (ref 22–29)
HDLC SERPL-MCNC: 71 MG/DL
LDLC SERPL CALC-MCNC: 97 MG/DL
MICROALBUMIN UR-MCNC: <12 MG/L
MICROALBUMIN/CREAT UR: NORMAL MG/G{CREAT}
NONHDLC SERPL-MCNC: 117 MG/DL
POTASSIUM SERPL-SCNC: 4.1 MMOL/L (ref 3.4–5.3)
PROT SERPL-MCNC: 6.9 G/DL (ref 6.4–8.3)
SODIUM SERPL-SCNC: 138 MMOL/L (ref 135–145)
TRIGL SERPL-MCNC: 99 MG/DL

## 2025-03-24 PROCEDURE — 82043 UR ALBUMIN QUANTITATIVE: CPT | Performed by: FAMILY MEDICINE

## 2025-03-24 PROCEDURE — 80061 LIPID PANEL: CPT | Performed by: FAMILY MEDICINE

## 2025-03-24 PROCEDURE — 80053 COMPREHEN METABOLIC PANEL: CPT | Performed by: FAMILY MEDICINE

## 2025-03-24 PROCEDURE — 82570 ASSAY OF URINE CREATININE: CPT | Performed by: FAMILY MEDICINE

## 2025-03-30 DIAGNOSIS — L40.50 PSA (PSORIATIC ARTHRITIS) (H): ICD-10-CM

## 2025-04-07 RX ORDER — METHOTREXATE 2.5 MG/1
7.5 TABLET ORAL
Qty: 36 TABLET | Refills: 0 | Status: SHIPPED | OUTPATIENT
Start: 2025-04-07 | End: 2025-04-08

## 2025-04-08 ENCOUNTER — OFFICE VISIT (OUTPATIENT)
Dept: RHEUMATOLOGY | Facility: CLINIC | Age: 75
End: 2025-04-08
Payer: COMMERCIAL

## 2025-04-08 VITALS
HEART RATE: 76 BPM | DIASTOLIC BLOOD PRESSURE: 70 MMHG | OXYGEN SATURATION: 98 % | WEIGHT: 139.6 LBS | BODY MASS INDEX: 24.73 KG/M2 | SYSTOLIC BLOOD PRESSURE: 130 MMHG | RESPIRATION RATE: 17 BRPM

## 2025-04-08 DIAGNOSIS — M19.042 LOCALIZED PRIMARY OSTEOARTHRITIS OF BOTH HANDS: ICD-10-CM

## 2025-04-08 DIAGNOSIS — M19.041 LOCALIZED PRIMARY OSTEOARTHRITIS OF BOTH HANDS: ICD-10-CM

## 2025-04-08 DIAGNOSIS — R76.8 ANA POSITIVE: ICD-10-CM

## 2025-04-08 DIAGNOSIS — L40.9 PSORIASIS: ICD-10-CM

## 2025-04-08 DIAGNOSIS — L40.50 PSA (PSORIATIC ARTHRITIS) (H): Primary | ICD-10-CM

## 2025-04-08 DIAGNOSIS — Z79.899 HIGH RISK MEDICATION USE: ICD-10-CM

## 2025-04-08 PROCEDURE — 3078F DIAST BP <80 MM HG: CPT | Performed by: INTERNAL MEDICINE

## 2025-04-08 PROCEDURE — 99214 OFFICE O/P EST MOD 30 MIN: CPT | Performed by: INTERNAL MEDICINE

## 2025-04-08 PROCEDURE — 3075F SYST BP GE 130 - 139MM HG: CPT | Performed by: INTERNAL MEDICINE

## 2025-04-08 PROCEDURE — G2211 COMPLEX E/M VISIT ADD ON: HCPCS | Performed by: INTERNAL MEDICINE

## 2025-04-08 RX ORDER — METHOTREXATE 2.5 MG/1
7.5 TABLET ORAL
Status: SHIPPED
Start: 2025-04-08

## 2025-04-08 NOTE — PROGRESS NOTES
Rheumatology follow-up visit note     Ceci is a 74 year old female presents today for follow-up.    Ceci was seen today for recheck.    Diagnoses and all orders for this visit:    PSA (psoriatic arthritis) (H)  -     methotrexate 2.5 MG tablet; Take 3 tablets (7.5 mg) by mouth every 7 days.    Localized primary osteoarthritis of both hands    High risk medication use    TAMIKO positive    Psoriasis        The longitudinal plan of care for the diagnosis(es)/condition(s) as documented were addressed during this visit. Due to the added complexity in care, I will continue to support Ceci in the subsequent management and with ongoing continuity of care.      Follow up in 6 months.  Labs every 3 months.    HPI    Ceci Garcia is a 74 year old female is here for follow-up of psoriatic arthritis, osteoarthritis leading to polyarthralgias she has a positive TAMIKO.  She has noted improvement in both her joint symptoms and her psoriasis with leflunomide however her dermatologist wanted her to try methotrexate instead that was reinitiated, not looking back she feels methotrexate is a much better choice for her for both her psoriasis and psoriatic arthritis.  She feels her hand function has improved dramatically with this.  Her recent labs are reviewed within normal range.  She takes no more than 2 drinks of wine per week.  She is on folic acid that she buys over-the-counter.  She is sure it is 1 mg and not 400 mcg tablets.  Her psoriasis remains under good control with methotrexate.  She noted overall pain level is 0/10, morning stiffness noted at 1 minute.  Able to do all day-to-day activities without difficulty.  Her TAMIKO positivity without dsDNA is is not associated with signals to suggest an active connective tissue disease.      DETAILED EXAMINATION  04/08/25  :    Vitals:    04/08/25 0929   BP: 130/70   BP Location: Right arm   Patient Position: Sitting   Cuff Size: Adult Regular   Pulse: 76   Resp: 17   SpO2: 98%    Weight: 63.3 kg (139 lb 9.6 oz)     Alert oriented. Head including the face is examined for malar rash, heliotropes, scarring, lupus pernio. Eyes examined for redness such as in episcleritis/scleritis, periorbital lesions.   Neck/ Face examined for parotid gland swelling, range of motion of neck.  Left upper and lower and right upper and lower extremities examined for tenderness, swelling, warmth of the appendicular joints, range of motion, edema, rash.  Some of the important findings included: she does not have evidence of synovitis in the palpable joints of the upper extremities.  No significant deformities of the digits.  + [place] nodes.  Range of motion of the shoulders  show full abduction.  No JLT effusion or warmth of the knees.  she does not have dactylitis of the digits.     Patient Active Problem List    Diagnosis Date Noted    TAMIKO positive 2024     Priority: Medium    Rash of both hands 2024     Priority: Medium    History of psoriatic arthritis 2024     Priority: Medium    Closed compression fracture of body of L1 vertebra (H) 2021     Priority: Medium    Type 2 diabetes mellitus without complication (H) 2021     Priority: Medium    Hyperlipidemia 2021     Priority: Medium    High risk medication use 2020     Priority: Medium    PSA (psoriatic arthritis) (H) 2019     Priority: Medium    Psoriasis 2019     Priority: Medium    Localized primary osteoarthritis of both hands 2019     Priority: Medium     Past Surgical History:   Procedure Laterality Date    BIOPSY BREAST      BREAST CYST ASPIRATION      BREAST CYST EXCISION       SECTION      ECTOPIC PREGNANCY SURGERY      KIDNEY SURGERY        Past Medical History:   Diagnosis Date    Diabetes mellitus (H)     Hyperlipidemia      Allergies   Allergen Reactions    Ciprofloxacin Anaphylaxis, Hives, Shortness Of Breath and Swelling    Nirmatrelvir-Ritonavir Nausea and Diarrhea     Paxlovid     Oxycodone-Aspirin      Other reaction(s): breathing issues    Oxycodone-Acetaminophen Rash     Current Outpatient Medications   Medication Sig Dispense Refill    acetaminophen (TYLENOL) 325 MG tablet [ACETAMINOPHEN (TYLENOL) 325 MG TABLET] Take 325 mg by mouth every 6 (six) hours as needed for pain.      aspirin 81 MG EC tablet [ASPIRIN 81 MG EC TABLET] Take 81 mg by mouth daily.       b complex vitamins capsule [B COMPLEX VITAMINS CAPSULE] Take 1 capsule by mouth daily.       CALCIUM ORAL [CALCIUM ORAL] Take 1 tablet by mouth daily.       cholecalciferol, vitamin D3, (VITAMIN D3) 5,000 unit Tab [CHOLECALCIFEROL, VITAMIN D3, (VITAMIN D3) 5,000 UNIT TAB] Take 5,000 Units by mouth daily.       citalopram (CELEXA) 20 MG tablet 1-2 tab(s)      clobetasol (TEMOVATE) 0.05 % external ointment  (Patient not taking: Reported on 1/8/2025)      cyanocobalamin, vitamin B-12, (VITAMIN B-12 ORAL) [CYANOCOBALAMIN, VITAMIN B-12, (VITAMIN B-12 ORAL)] Take 5,000 mcg by mouth daily.      diazepam (VALIUM) 5 MG tablet Take 1 tablet (5 mg) by mouth every 6 hours as needed for muscle spasms or pain 20 tablet 0    estradiol (ESTRACE) 0.01 % (0.1 mg/gram) vaginal cream [ESTRADIOL (ESTRACE) 0.01 % (0.1 MG/GRAM) VAGINAL CREAM] Insert 1 g into the vagina every other day.       folic acid (FOLVITE) 1 MG tablet Take 3 tablets (3 mg) by mouth daily.      glipiZIDE (GLUCOTROL) 10 MG 24 hr tablet [GLIPIZIDE (GLUCOTROL) 10 MG 24 HR TABLET] Take 10 mg by mouth daily.             insulin glargine (LANTUS SOLOSTAR) 100 unit/mL (3 mL) pen [INSULIN GLARGINE (LANTUS SOLOSTAR) 100 UNIT/ML (3 ML) PEN] Inject 19 Units under the skin at bedtime.       lidocaine (XYLOCAINE) 5 % ointment [LIDOCAINE (XYLOCAINE) 5 % OINTMENT] Apply to posterior neck 2 gms 4 times/day 150 g 0    LORazepam (ATIVAN) 0.5 MG tablet [LORAZEPAM (ATIVAN) 0.5 MG TABLET] Take 0.5 mg by mouth at bedtime.       metFORMIN (GLUCOPHAGE-XR) 500 MG 24 hr tablet [METFORMIN (GLUCOPHAGE-XR) 500  MG 24 HR TABLET] Take 500 mg by mouth daily with breakfast.       methotrexate 2.5 MG tablet TAKE 3 TABLETS (7.5 MG) BY MOUTH EVERY 7 DAYS 36 tablet 0    ondansetron (ZOFRAN-ODT) 8 MG ODT tab Take 1 tablet (8 mg) by mouth every 8 hours as needed for nausea 12 tablet 0    simvastatin (ZOCOR) 20 MG tablet [SIMVASTATIN (ZOCOR) 20 MG TABLET] Take 20 mg by mouth at bedtime.              family history includes Dementia in her mother; Heart Disease in her father; Hypertension in her mother; No Known Problems in her daughter.  Social Connections: Not on file          WBC Count   Date Value Ref Range Status   04/04/2025 6.4 4.0 - 11.0 10e3/uL Final     RBC Count   Date Value Ref Range Status   04/04/2025 3.84 3.80 - 5.20 10e6/uL Final     Hemoglobin   Date Value Ref Range Status   04/04/2025 12.6 11.7 - 15.7 g/dL Final     Hematocrit   Date Value Ref Range Status   04/04/2025 38.5 35.0 - 47.0 % Final     MCV   Date Value Ref Range Status   04/04/2025 100 78 - 100 fL Final     MCH   Date Value Ref Range Status   04/04/2025 32.8 26.5 - 33.0 pg Final     Platelet Count   Date Value Ref Range Status   04/04/2025 238 150 - 450 10e3/uL Final     % Lymphocytes   Date Value Ref Range Status   06/11/2019 16 (L) 20 - 40 % Final     AST   Date Value Ref Range Status   03/24/2025 20 0 - 45 U/L Final     ALT   Date Value Ref Range Status   04/04/2025 13 0 - 50 U/L Final     Albumin   Date Value Ref Range Status   04/04/2025 4.0 3.5 - 5.2 g/dL Final   06/15/2022 3.7 3.5 - 5.0 g/dL Final     Alkaline Phosphatase   Date Value Ref Range Status   03/24/2025 56 40 - 150 U/L Final     Creatinine   Date Value Ref Range Status   04/04/2025 0.91 0.51 - 0.95 mg/dL Final     GFR Estimate   Date Value Ref Range Status   04/04/2025 65 >60 mL/min/1.73m2 Final     Comment:     eGFR calculated using 2021 CKD-EPI equation.   06/11/2021 >60 >60 mL/min/1.73m2 Final     GFR Estimate If Black   Date Value Ref Range Status   06/11/2021 >60 >60 mL/min/1.73m2  Final     Creatinine Urine mg/dL   Date Value Ref Range Status   03/24/2025 96.1 mg/dL Final     Comment:     The reference ranges have not been established in urine creatinine. The results should be integrated into the clinical context for interpretation.     Erythrocyte Sedimentation Rate   Date Value Ref Range Status   11/08/2018 12 0 - 20 mm/hr Final     CRP   Date Value Ref Range Status   11/08/2018 1.1 (H) 0.0 - 0.8 mg/dL Final

## 2025-05-13 ENCOUNTER — OFFICE VISIT (OUTPATIENT)
Dept: PHARMACY | Facility: PHYSICIAN GROUP | Age: 75
End: 2025-05-13
Payer: COMMERCIAL

## 2025-05-13 VITALS — HEART RATE: 85 BPM | DIASTOLIC BLOOD PRESSURE: 84 MMHG | SYSTOLIC BLOOD PRESSURE: 138 MMHG

## 2025-05-13 DIAGNOSIS — R52 PAIN: ICD-10-CM

## 2025-05-13 DIAGNOSIS — F32.9 MAJOR DEPRESSIVE DISORDER, REMISSION STATUS UNSPECIFIED, UNSPECIFIED WHETHER RECURRENT: ICD-10-CM

## 2025-05-13 DIAGNOSIS — F41.9 ANXIETY: ICD-10-CM

## 2025-05-13 DIAGNOSIS — L40.50 PSA (PSORIATIC ARTHRITIS) (H): ICD-10-CM

## 2025-05-13 DIAGNOSIS — N39.0 RECURRENT UTI: ICD-10-CM

## 2025-05-13 DIAGNOSIS — Z79.4 TYPE 2 DIABETES MELLITUS WITHOUT COMPLICATION, WITH LONG-TERM CURRENT USE OF INSULIN (H): Primary | ICD-10-CM

## 2025-05-13 DIAGNOSIS — E78.5 HYPERLIPIDEMIA, UNSPECIFIED HYPERLIPIDEMIA TYPE: ICD-10-CM

## 2025-05-13 DIAGNOSIS — Z78.9 TAKES DIETARY SUPPLEMENTS: ICD-10-CM

## 2025-05-13 DIAGNOSIS — E11.9 TYPE 2 DIABETES MELLITUS WITHOUT COMPLICATION, WITH LONG-TERM CURRENT USE OF INSULIN (H): Primary | ICD-10-CM

## 2025-05-13 PROCEDURE — 3079F DIAST BP 80-89 MM HG: CPT | Performed by: PHARMACIST

## 2025-05-13 PROCEDURE — 99607 MTMS BY PHARM ADDL 15 MIN: CPT | Performed by: PHARMACIST

## 2025-05-13 PROCEDURE — 3075F SYST BP GE 130 - 139MM HG: CPT | Performed by: PHARMACIST

## 2025-05-13 PROCEDURE — 99605 MTMS BY PHARM NP 15 MIN: CPT | Performed by: PHARMACIST

## 2025-05-13 RX ORDER — FOLIC ACID 1 MG/1
3 TABLET ORAL DAILY
COMMUNITY

## 2025-05-13 RX ORDER — CALCIUM CARBONATE/VITAMIN D3 600 MG-10
1 TABLET ORAL DAILY
COMMUNITY

## 2025-05-13 RX ORDER — HYDROCHLOROTHIAZIDE 12.5 MG/1
CAPSULE ORAL
COMMUNITY

## 2025-05-13 NOTE — LETTER
"Recommended To-Do List      Prepared on: May 13, 2025       You can get the best results from your medications by completing the items on this \"To-Do List.\"      Bring your To-Do List when you go to your doctor. And, share it with your family or caregivers.    My To-Do List:  What we talked about: What I should do:   The importance checking your blood sugar and starting continuous blood sugar monitor.     Start using Freestyle Porsche 3 continuous glucose monitoring.      - Change sensor every 15 days     - Aim for 70% or more time in range for blood sugar levels          What we talked about: What I should do:    What my medicines are for, how to know if my medicines are working, made sure my medicines are safe for me and reviewed how to take my medicines.      Take my medicines every day                "

## 2025-05-13 NOTE — PROGRESS NOTES
Medication Therapy Management (MTM) Encounter    ASSESSMENT:                            Medication Adherence/Access: No issues identified.  _  Diabetes  A1c is not at goal of <7.5% (higher goal based on age, other conditions without CV disease)  Not checking BG due to finger issues with arthritis. Occasional hyperglycemia symptoms. Suboptimal diet with irregular meals and frequent snacking.  Current regimen: metformin 500mg ER (1 AM, 2 PM), glipizide 10mg ER daily AM, Lantus insulin (~18u).  Reports yearly metformin-related GI upset.  Patient would benefit from:  - Initiate CGM (Freestyle Porsche 3)  - Continue current medications  - Phone follow-up in 2 weeks to review CGM data  - Adjust medications based on CGM data  - Encouraged improved nutrition and regular meals  _  Depression  Anxiety   Stable. On citalopram 20mg daily, no recent lorazepam refills.   _  Psoriatic Arthritis  Current regimen: methotrexate 3 tabs weekly and folic acid 3mg daily (except Fridays).  Methotrexate initially effective, now worsening symptoms in hands  Patient would benefit from:  - Continue current regimen  - Upcoming rheumatologist appointment for re-evaluation  _  Pain  Stable with acetaminophen as needed  _  Hyperlipidemia  Stable.   _  Genitourinary Symptoms  Stable. Recurrent UTIs improved with topical estradiol.   _  Supplements  Stable. No interactions or concerns identified.   _____________________  PLAN:                            - Continue current medications  - Start using Freestyle Porsche 3 continuous glucose monitoring.      - Change sensor every 15 days     - Aim for 70% or more time in range for blood sugar levels    Lifestyle Adjustments:  - Improve nutrition and try to eat 3-5 small meals a day instead of snacking  - Keep healthy snacks around the house    Follow-Up:  - Phone visit on May 27th at 10:00 AM to review glucose readings and adjust medications if needed    SUBJECTIVE/OBJECTIVE:                          Ceci  Radha is a 74 year old female seen for an initial visit. She was referred to me from Dr. Elmore.      Reason for visit: Uncontrolled diabetes, new CGM start.    Allergies/ADRs: Reviewed in chart  Past Medical History: Reviewed in chart  Tobacco: She reports that she has quit smoking. She has never used smokeless tobacco.  Alcohol: no more than 2 drinks of wine per week    Medication Adherence/Access:   Uses pillboxes. No issues reported.     Diabetes   - Metformin ER 500mg 1 tablet in the morning, 2 tablets in the evening  - Glipizide ER 10mg 1 tablet in the morning  - Lantus: approximately 18 units daily  - Aspirin 81 mg once daily  No side effects reported. Occasional diarrhea or stomach issues with metformin, but only about once a year.   Not checking blood sugar due to finger discomfort, interested in continuous glucose monitoring and is here today to learn how to use.   Occasional high blood sugar symptoms- frequent urination   Poor nutrition; snacking instead of structured meals is what she thinks contributed to high A1c. May have also had course of steroids.   Last A1c: 9% (March), previously 7.9%  Eye exam and foot exam up to date.     Depression  Anxiety   Citalopram 20mg daily  Lorazepam: as needed, no refills in past year  No issues reported.     Psoriatic Arthritis  Methotrexate 7.5 mg (3x 2.5 mg tablets) weekly  Folic acid 3mg daily except Friday  Upcoming rheumatologist appointment. This had been working really well at first but lately has been having more issues with her hands. She gets folic acid over the counter and confirms it is 1 mg tablets. No methotrexate side effects reported.   Previous therapy: leflunomide     Pain Management  Tylenol: as needed  No issues reported    Hyperlipidemia  Simvastatin 20mg at bedtime  No issues reported    Recurrent UTIs  Estradiol cream: Every other day, for dryness and UTIs, helpful  History of frequent UTIs, improved with estradiol  cream    Supplements  B-complex vitamin 1 tablet daily  Vitamin D 5000 units daily   Calcium with vitamin D 1 tablet daily  No issues reported    Today's Vitals: /84 (BP Location: Right arm, Patient Position: Sitting, Cuff Size: Adult Regular)   Pulse 85   ----------------      I spent 42 minutes with this patient today. All changes were made via collaborative practice agreement with Basia Elmore MD.     A summary of these recommendations was mailed to the patient.    Dia Aguillon, PharmD, BCACP  Medication Therapy Management Pharmacist  [facility]  [phone]           Medication Therapy Recommendations  Type 2 diabetes mellitus without complication (H)   1 Current Medication: Continuous Glucose Sensor (FREESTYLE AAMIR 3 PLUS SENSOR) MISC   Current Medication Sig: Change every 15 days.   Rationale: Does not understand instructions - Adherence - Adherence   Recommendation: Provide Education   Status: Patient Agreed - Adherence/Education   Identified Date: 5/13/2025 Completed Date: 5/13/2025

## 2025-05-13 NOTE — LETTER
_  Medication List        Prepared on: May 13, 2025     Bring your Medication List when you go to the doctor, hospital, or   emergency room. And, share it with your family or caregivers.     Note any changes to how you take your medications.  Cross out medications when you no longer use them.    Medication How I take it Why I use it Prescriber   acetaminophen (TYLENOL) 500 MG tablet Take 500-1,000 mg by mouth every 6 hours as needed for pain. Pain Patient Reported   aspirin 81 MG EC tablet Take 81 mg by mouth daily. Reduce Risk of Heart Disease or Stroke Basia Elmore MD   b complex vitamins capsule Take 1 capsule by mouth daily. General Health Patient Reported   calcium carbonate-vitamin D (CALTRATE) 600-10 MG-MCG per tablet Take 1 tablet by mouth daily. Bone Health Patient Reported   cholecalciferol, vitamin D3, (VITAMIN D3) 5,000 unit Tab Take 5,000 Units by mouth daily. General Health Patient Reported   citalopram (CELEXA) 20 MG tablet Take 20 mg by mouth daily. Anxiety, Mood Basia Elmore MD   Continuous Glucose Sensor (FREESTYLE AAMIR 3 PLUS SENSOR) MISC Change every 15 days. Diabetes Basia Elmore MD   estradiol (ESTRACE) 0.01 % (0.1 mg/gram) vaginal cream Place 1 g vaginally every other day. Frequent Urinary Tract Infections Basia Elmore MD   folic acid (FOLVITE) 1 MG tablet Take 3 mg by mouth daily. (Except Friday) Prevent Methotrexate  Side Effects Patient Reported   glipiZIDE (GLUCOTROL) 10 MG 24 hr tablet Take 10 mg by mouth daily. Diabetes Basai Elmore MD   insulin glargine (LANTUS SOLOSTAR) 100 unit/mL (3 mL) pen Inject 18 Units subcutaneously at bedtime. Diabetes Basia Elmore MD   LORazepam (ATIVAN) 0.5 MG tablet Take 0.5 mg by mouth 3 times daily as needed for anxiety. Feeling Anxious Basia Elmore MD   metFORMIN (GLUCOPHAGE-XR) 500 MG 24 hr tablet Take 500 mg by mouth daily (with breakfast). And 1000 mg in evening Diabetes Basia Chavarria  MD [place]   methotrexate 2.5 MG tablet Take 3 tablets (7.5 mg) by mouth every 7 days. PSA (Psoriatic Arthritis) (H) LENCHO Mcgee   simvastatin (ZOCOR) 20 MG tablet Take 20 mg by mouth at bedtime. High Cholesterol Basia Elmore MD         Add new medications, over-the-counter drugs, herbals, vitamins, or  minerals in the blank rows below.    Medication How I take it Why I use it Prescriber                                      Allergies:      - Ciprofloxacin - Anaphylaxis, Hives, Shortness Of Breath, Swelling  - Nirmatrelvir-ritonavir - Nausea, Diarrhea  - Oxycodone-aspirin  - Oxycodone-acetaminophen - Rash        Side effects I have had:      Not on File        Other Information:              My notes and questions:

## 2025-05-13 NOTE — LETTER
May 18, 2025  Ceci Garcia  [address] [place] [id number]    Dear Ms. Garcia, [facility]     Thank you for talking with me on May 13, 2025 about your health and medications. As a follow-up to our conversation, I have included two documents:      Your Recommended To-Do List has steps you should take to get the best results from your medications.  Your Medication List will help you keep track of your medications and how to take them.    If you want to talk about these documents, please call Dia Aguillon PharmD at phone: [phone], Monday-Friday 8-4:30pm.    I look forward to working with you and your doctors to make sure your medications work well for you.    Sincerely,  Dia Aguillon, PharmLORA  [facility] Pharmacist, [facility]

## 2025-05-19 NOTE — PATIENT INSTRUCTIONS
Recommendations from today's MTM visit:                                                         - Continue current medications  - Start using Freestyle Porsche 3 continuous glucose monitoring.      - Change sensor every 15 days     - Aim for 70% or more time in range for blood sugar levels    Lifestyle Adjustments:  - Improve nutrition and try to eat 3-5 small meals a day instead of snacking  - Keep healthy snacks around the house    Follow-Up:  - Phone visit on May 27th at 10:00 AM to review glucose readings and adjust medications if needed    It was great speaking with you today.  I value your experience and would be very thankful for your time in providing feedback in our clinic survey. In the next few days, you may receive an email or text message from [employer] with a link to a survey related to your clinical pharmacist.    To schedule another MTM appointment, please call [phone].    My Clinical Pharmacist's contact information:                                                      Please feel free to contact me with any questions or concerns you have.      Dia Aguillon, PharmD, BCACP  Medication Therapy Management Pharmacist  [facility]  [phone]

## 2025-05-27 ENCOUNTER — VIRTUAL VISIT (OUTPATIENT)
Dept: PHARMACY | Facility: PHYSICIAN GROUP | Age: 75
End: 2025-05-27
Payer: COMMERCIAL

## 2025-05-27 DIAGNOSIS — Z79.4 TYPE 2 DIABETES MELLITUS WITHOUT COMPLICATION, WITH LONG-TERM CURRENT USE OF INSULIN (H): Primary | ICD-10-CM

## 2025-05-27 DIAGNOSIS — E11.9 TYPE 2 DIABETES MELLITUS WITHOUT COMPLICATION, WITH LONG-TERM CURRENT USE OF INSULIN (H): Primary | ICD-10-CM

## 2025-05-27 PROCEDURE — 99607 MTMS BY PHARM ADDL 15 MIN: CPT | Mod: 93 | Performed by: PHARMACIST

## 2025-05-27 PROCEDURE — 99606 MTMS BY PHARM EST 15 MIN: CPT | Mod: 93 | Performed by: PHARMACIST

## 2025-05-27 RX ORDER — GLIPIZIDE 5 MG/1
5 TABLET ORAL
COMMUNITY

## 2025-05-27 NOTE — PATIENT INSTRUCTIONS
Recommendations from today's MTM visit:                                                       Medication Adjustments:  - Morning: Take 1 metformin  mg tablet  - Evening (before dinner, around 4 PM):    - Take 2 metformin  mg tablets    - Take 1 glipizide 5 mg tablet (new prescription)    - Inject 18 units of Lantus insulin    Medical Devices:  - Change your glucose sensor today  - For future changes:    - Move the sensor slightly on the same arm or switch arms    - Use your phone to scan and start the new sensor    Follow-Up: Phone call in 2 weeks on Tuesday, June 10th at 10 AM    It was great speaking with you today.  I value your experience and would be very thankful for your time in providing feedback in our clinic survey. In the next few days, you may receive an email or text message from [employer] with a link to a survey related to your clinical pharmacist.    To schedule another MTM appointment, please call [phone].    My Clinical Pharmacist's contact information:                                                      Please feel free to contact me with any questions or concerns you have.      Dia Aguillon, PharmD, BCACP  Medication Therapy Management Pharmacist  [facility]  [phone]

## 2025-05-27 NOTE — PROGRESS NOTES
Medication Therapy Management (MTM) Encounter    ASSESSMENT:                            Medication Adherence/Access: No issues identified.  _  Diabetes  HbA1c 9% in March, current readings suggest improvement but not at goal.  Time in target is 42% last 2 weeks, with 7% low (<70) and 51% above target.  Erratic glucose levels with evening elevations and morning-midday lows.  Daytime hypoglycemia around noon/2 PM, possibly due to glipizide ER.  Bedtime snack contributing to nighttime hyperglycemia.  Current regimen: metformin, glipizide ER, basal insulin.  Patient would benefit from:  - Adjust insulin: Decrease to 18 units and move to before dinner  - Modify glipizide:    - Discontinue ER formulation    - Start 5 mg IR with evening meal  - Adjust medication timing:    - AM: Continue metformin 1 tablet    - PM (4 PM, before dinner):       - Metformin 2 tablets      - Insulin 18 units      - Glipizide 5 mg  - Send new glipizide prescription to pharmacy  - Patient education on new medication regimen and CGM sensor change procedure  - Monitor glucose fluctuations  - Consider medication changes if control remains suboptimal  _____________________  PLAN:                            Medication Adjustments:  - Morning: Take 1 metformin  mg tablet  - Evening (before dinner, around 4 PM):    - Take 2 metformin  mg tablets    - Take 1 glipizide 5 mg tablet (new prescription)    - Inject 18 units of Lantus insulin    Medical Devices:  - Change your glucose sensor today  - For future changes:    - Move the sensor slightly on the same arm or switch arms    - Use your phone to scan and start the new sensor    Follow-Up: Phone call in 2 weeks on Tuesday, June 10th at 10 AM    SUBJECTIVE/OBJECTIVE:                          Ceci Garcia is a 74 year old female seen for a follow-up visit.       Reason for visit: Diabetes follow-up.    Allergies/ADRs: Reviewed in chart  Past Medical History: Reviewed in chart  Tobacco: She  reports that she has quit smoking. She has never used smokeless tobacco.  Alcohol: no more than 2 drinks of wine per week    Medication Adherence/Access:   no issues reported.  Uses pillbox.     Diabetes  - Metformin  mg 1 tablet AM (9 am), 2 tablets PM (9:30-10 pm)  - Glipizide ER 10 mg 1  tablet AM (9 am)  - Insulin glargine (Lantus) 20 units PM (9:30-10 pm)  Reports erratic glucose levels since starting CGM, with significant daily fluctuations  BG spikes at night (8 pm-midnight), attributed to bedtime snack  Experiences high and low BG episodes  Difficulty distinguishing between actual symptoms and reaction to CGM alarm  Due to change CGM sensor today, prepared with instructions and supplies  Adhering to current medication regimen, willing to adjust as recommended  [employer] CGM Report:  Name: Ceci Garcia  YOB: 1950  Report Period: 05/14/2025 - 05/27/2025 (14 days)  Generated: 05/27/2025  Time CGM Active: 97%  Glucose Statistics and Targets  Average Glucose: 193 mg/dL  Glucose Management Indicator (GMI): 7.9%  Glucose Variability (%CV): 45.9%  Target Range: 70 - 180 mg/dL  Time in Ranges  Very High: >250 mg/dL --- 27%  High: 181 - 250 mg/dL --- 24%  Target Range: 70 - 180 mg/dL --- 42%  Low: 54 - 69 mg/dL --- 6%  Very Low: <54 mg/dL --- 1%    Today's Vitals: There were no vitals taken for this visit.  ----------------      I spent 13 minutes with this patient today. All changes were made via collaborative practice agreement with Basia Elmore MD.     A summary of these recommendations was sent via clinic portal.    Dia Aguillon, PharmD, BCACP  Medication Therapy Management Pharmacist  [facility] [[email or web address]]  [phone]      Telemedicine Visit Details  The patient's medications can be safely assessed via a telemedicine encounter.  Type of service:  Telephone visit  Originating Location (pt. Location): Home    Distant Location (provider location):  On-site  Start Time: 10:02 AM  End  Time: 10:15 AM     Medication Therapy Recommendations  Type 2 diabetes mellitus without complication (H)   1 Current Medication: glipiZIDE (GLUCOTROL) 10 MG 24 hr tablet (Discontinued)   Current Medication Sig: Take 10 mg by mouth daily.   Rationale: Undesirable effect - Adverse medication event - Safety   Recommendation: Decrease Dose - glipiZIDE 5 MG tablet   Status: Accepted per CPA   Identified Date: 5/27/2025 Completed Date: 5/27/2025         2 Current Medication: insulin glargine (LANTUS SOLOSTAR) 100 unit/mL (3 mL) pen   Current Medication Sig: Inject 18 Units subcutaneously every evening. (Before dinner)   Rationale: Condition refractory to medication - Ineffective medication - Effectiveness   Recommendation: Change Administration Time   Status: Accepted - no CPA Needed   Identified Date: 5/27/2025 Completed Date: 5/27/2025

## 2025-06-10 ENCOUNTER — VIRTUAL VISIT (OUTPATIENT)
Dept: PHARMACY | Facility: PHYSICIAN GROUP | Age: 75
End: 2025-06-10
Payer: COMMERCIAL

## 2025-06-10 DIAGNOSIS — Z79.4 TYPE 2 DIABETES MELLITUS WITHOUT COMPLICATION, WITH LONG-TERM CURRENT USE OF INSULIN (H): Primary | ICD-10-CM

## 2025-06-10 DIAGNOSIS — E11.9 TYPE 2 DIABETES MELLITUS WITHOUT COMPLICATION, WITH LONG-TERM CURRENT USE OF INSULIN (H): Primary | ICD-10-CM

## 2025-06-10 PROCEDURE — 99606 MTMS BY PHARM EST 15 MIN: CPT | Mod: 93 | Performed by: PHARMACIST

## 2025-06-10 PROCEDURE — 99607 MTMS BY PHARM ADDL 15 MIN: CPT | Mod: 93 | Performed by: PHARMACIST

## 2025-06-10 NOTE — PROGRESS NOTES
Medication Therapy Management (MTM) Encounter    ASSESSMENT:                            Medication Adherence/Access: See below for considerations.  _  Diabetes   A1c 9% in March, due to recheck later this month  Time in target range is at goal of >60% the past week, now 66% compared to 42% last visit.   Low readings have improved from 7% last visit to 2% this visit.   Time with very high BG (>250 mg/dL) decreased from 27% to 9%.  Reports difficulty with afternoon medication timing at 4 pm and occasional missed doses.  Patient experiencing nocturnal hypoglycemia, requiring orange juice consumption at 3 AM. Current Lantus dose may be too high.  Patient would benefit from:  - Reduce Lantus dose from 18 to 17 units  - Shift evening medications to before dinner (5:30-6:00 PM) rather than 4 pm  - Continue BG monitoring  - Encouraged continued dietary modifications  _________________________________________  PLAN:                            Medications:  - Take evening medications before dinner, around 5:30-6:00 PM  - Reduce Lantus dose to 17 units  - Continue metformin  mg morning and 1000 mg evening before dinner  - Continue glipizide 5 mg evening before dinner    Lifestyle Changes:  - Eat more protein-rich foods and snacks like nuts and trail mix  - Continue making diet changes to reduce carb intake    Follow-up:  - MTM phone appointment on June 24th at 10:00 AM  - In-person appointment with Dr. Elmore on June 25th for diabetes check    SUBJECTIVE/OBJECTIVE:                          Ceci Garcia is a 74 year old female seen for a follow-up visit.       Reason for visit: Diabetes follow-up.    Allergies/ADRs: Reviewed in chart  Past Medical History: Reviewed in chart  Tobacco: She reports that she has quit smoking. She has never used smokeless tobacco.  Alcohol: no more than 2 drinks of wine per week     Medication Adherence/Access:   Uses pillbox. Struggling with timing of afternoon being 4 pm. She does think it  would be easier to take closer to when she eats dinner at 6 pm.    Diabetes   - Metformin  mg 1 tablet AM (9 am), 2 tablets PM (4 pm)  - Glipizide 5 mg 1  tablet PM (4 pm)  - Insulin glargine (Lantus) 18 units PM     Switched timing of her evening medications after our last visit. Patient struggles with timing of afternoon medications at 4 pm, especially when out of home. She typically eats dinner around 6 pm but has been taking her medications around 4 pm since last visit.   Occasional nocturnal hypoglycemia, treated with orange juice. Early morning and overnight hypoglycemia has decreased since last visit but still occurring occasionally.   Making dietary changes:  - Increased nut intake  - Reduced carbohydrates  - Still prefers carbohydrates so this has been hard for her    [employer] CGM Report:  Name: Ceci Garcia  YOB: 1950  Report Period: 06/04/2025 - 06/10/2025 (7 days)  Generated: 06/10/2025  Time CGM Active: 93%  Glucose Statistics and Targets  Average Glucose: 163 mg/dL  Glucose Management Indicator (GMI): 7.2%  Glucose Variability (%CV): 37.3%  Target Range: 70 - 180 mg/dL  Time in Ranges  Very High: >250 mg/dL --- 9%  High: 181 - 250 mg/dL --- 23%  Target Range: 70 - 180 mg/dL --- 66%  Low: 54 - 69 mg/dL --- 2%  Very Low: <54 mg/dL --- 0%      Today's Vitals: There were no vitals taken for this visit.  ----------------      I spent 17 minutes with this patient today. All changes were made via collaborative practice agreement with Basia Elmore MD.     A summary of these recommendations was sent via clinic portal.    Dia Aguillon, PharmD, BCACP  Medication Therapy Management Pharmacist  [facility]  [phone]      Telemedicine Visit Details  The patient's medications can be safely assessed via a telemedicine encounter.  Type of service:  Telephone visit  Originating Location (pt. Location): Home    Distant Location (provider location):  On-site  Start Time: 10:08  AM  End Time: 10:25 AM     Medication Therapy Recommendations  Type 2 diabetes mellitus without complication (H)   1 Current Medication: glipiZIDE (GLUCOTROL) 5 MG tablet   Current Medication Sig: Take 5 mg by mouth daily (before supper).   Rationale: Patient forgets to take - Adherence - Adherence   Recommendation: Provide Adherence Intervention   Status: Patient Agreed - Adherence/Education   Identified Date: 6/10/2025 Completed Date: 6/10/2025         2 Current Medication: insulin glargine (LANTUS SOLOSTAR) 100 unit/mL (3 mL) pen   Current Medication Sig: Inject 17 Units subcutaneously every evening. (Before dinner)   Rationale: Dose too high - Dosage too high - Safety   Recommendation: Decrease Dose   Status: Accepted per CPA   Identified Date: 6/10/2025 Completed Date: 6/10/2025

## 2025-06-10 NOTE — PATIENT INSTRUCTIONS
Recommendations from today's MTM visit:                                                       Medications:  - Take evening medications before dinner, around 5:30-6:00 PM  - Reduce Lantus dose to 17 units  - Continue metformin  mg morning and 1000 mg evening before dinner  - Continue glipizide 5 mg evening before dinner    Lifestyle Changes:  - Eat more protein-rich foods and snacks like nuts and trail mix  - Continue making diet changes to reduce carb intake    Follow-up:  - MTM phone appointment on June 24th at 10:00 AM  - In-person appointment with Dr. Elmore on June 25th for diabetes check    It was great speaking with you today.  I value your experience and would be very thankful for your time in providing feedback in our clinic survey. In the next few days, you may receive an email or text message from [employer] with a link to a survey related to your clinical pharmacist.    To schedule another MTM appointment, please call [phone].    My Clinical Pharmacist's contact information:                                                      Please feel free to contact me with any questions or concerns you have.      Dia Aguillon, PharmD, BCACP  Medication Therapy Management Pharmacist  [facility]  [phone]

## 2025-06-24 ENCOUNTER — VIRTUAL VISIT (OUTPATIENT)
Dept: PHARMACY | Facility: PHYSICIAN GROUP | Age: 75
End: 2025-06-24
Payer: COMMERCIAL

## 2025-06-24 DIAGNOSIS — Z79.4 TYPE 2 DIABETES MELLITUS WITHOUT COMPLICATION, WITH LONG-TERM CURRENT USE OF INSULIN (H): Primary | ICD-10-CM

## 2025-06-24 DIAGNOSIS — E11.9 TYPE 2 DIABETES MELLITUS WITHOUT COMPLICATION, WITH LONG-TERM CURRENT USE OF INSULIN (H): Primary | ICD-10-CM

## 2025-06-24 PROCEDURE — 99607 MTMS BY PHARM ADDL 15 MIN: CPT | Mod: 93 | Performed by: PHARMACIST

## 2025-06-24 PROCEDURE — 99606 MTMS BY PHARM EST 15 MIN: CPT | Mod: 93 | Performed by: PHARMACIST

## 2025-06-24 NOTE — PROGRESS NOTES
Medication Therapy Management (MTM) Encounter    ASSESSMENT:                            Medication Adherence/Access  No issues identified.   _  Diabetes  A1c 9% in March, due to recheck tomorrow.   Blood glucose levels improved since last visit, fewer overnight lows.  Time in target range 61% the past 3 weeks.   Time low (<70) has decreased to 1% but still occur even for a brief amount of time in early morning (4-6 am)  Significant spikes post-carbohydrate intake noted but returns to normal range within reasonable time.  Patient would benefit from:  - Adjust Lantus: from bedtime to morning, starting tomorrow. Continue with 17 units daily.  - Continue glipizide 5 mg 1  tablet PM (before dinner)  - Continue metformin  mg 1 tablet AM (9 am), 2 tablets PM (before dinner)  - Replace CGM sensor today  - Provided education on carbohydrate intake and blood glucose effects  _____________________  PLAN:                            Medications:  - Lantus insulin:      - Change when you take Lantus from night to morning      - Do not take any insulin tonight     - Starting tomorrow morning, take Lantus 17 units every morning  - Continue glipizide 5 mg 1  tablet PM (before dinner)  - Continue metformin  mg 1 tablet AM (9 am), 2 tablets PM (before dinner)    Blood Sugar Monitor:     - Put on a new continuous glucose monitor today     - If you have trouble putting on the new monitor, bring it to tomorrow's appointment for help    Follow-up:   - Diabetes check with Dr. Elmore tomorrow  - [facility] pharmacist follow-up by phone in 2-4 weeks     SUBJECTIVE/OBJECTIVE:                          Ceci Garcia is a 74 year old female seen for a follow-up visit.       Reason for visit: Diabetes follow-.    Allergies/ADRs: Reviewed in chart  Past Medical History: Reviewed in chart  Tobacco: She reports that she has quit smoking. She has never used smokeless tobacco.  Alcohol: no more than 2 drinks of wine per week    Medication  Adherence/Access:   Uses pillbox.    Diabetes  - Lantus 17 units daily (PM)  - Metformin  mg 1 tablet AM (9 am), 2 tablets PM (before dinner)  - Glipizide 5 mg 1  tablet PM (before dinner)    Overall improvement in blood glucose control. Decreased Lantus by 1 unit after last visit with some improvement in overnight lows.   Continues to have nocturnal lows around 4 AM  Significant spikes after any carbohydrate intake  Recent incident: high BG after consuming waffles, fruit, and sweetened lemonade at graduation party; normalized by evening/next morning  Difficulty managing BG with carbohydrates, finds CGM helpful   Expresses willingness to adjust Lantus administration time as suggested to improve glucose control and reduce overnight lows  Last A1c 9%, diabetes check with provider tomorrow to recheck    [employer] 3 plus CGM Report:   Name: Ceci Garcia  YOB: 1950  Report Period: 06/04/2025 - 06/24/2025 (21 days)  Generated: 06/24/2025  Time CGM Active: 98%  Glucose Statistics and Targets  Average Glucose: 172 mg/dL  Glucose Management Indicator (GMI): 7.4%  Glucose Variability (%CV): 40.0%  Target Range: 70 - 180 mg/dL  Time in Ranges  Very High: >250 mg/dL --- 14%  High: 181 - 250 mg/dL --- 24%  Target Range: 70 - 180 mg/dL --- 61%  Low: 54 - 69 mg/dL --- 1%  Very Low: <54 mg/dL --- 0%          Today's Vitals: There were no vitals taken for this visit.  ----------------      I spent 10 minutes with this patient today. All changes were made via collaborative practice agreement with Basia Elmore MD.     A summary of these recommendations was sent via clinic portal..    Dia Aguillon, PharmD, BCACP  Medication Therapy Management Pharmacist  [facility]  [phone]      Telemedicine Visit Details  The patient's medications can be safely assessed via a telemedicine encounter.  Type of service:  Telephone visit  Originating Location (pt. Location): Home    Distant Location (provider  location):  On-site  Start Time: 10:07 AM  End Time: 10:15 AM     Medication Therapy Recommendations  Type 2 diabetes mellitus without complication (H)   1 Current Medication: insulin glargine (LANTUS SOLOSTAR) 100 unit/mL (3 mL) pen   Current Medication Sig: Inject 17 Units subcutaneously every evening.   Rationale: Undesirable effect - Adverse medication event - Safety   Recommendation: Change Administration Time   Status: Accepted - no CPA Needed   Identified Date: 6/24/2025 Completed Date: 6/24/2025

## 2025-06-25 ENCOUNTER — TRANSFERRED RECORDS (OUTPATIENT)
Dept: HEALTH INFORMATION MANAGEMENT | Facility: CLINIC | Age: 75
End: 2025-06-25
Payer: COMMERCIAL

## 2025-07-08 ENCOUNTER — LAB (OUTPATIENT)
Dept: LAB | Facility: CLINIC | Age: 75
End: 2025-07-08
Payer: COMMERCIAL

## 2025-07-08 DIAGNOSIS — L40.50 PSA (PSORIATIC ARTHRITIS) (H): ICD-10-CM

## 2025-07-08 DIAGNOSIS — Z79.899 HIGH RISK MEDICATION USE: ICD-10-CM

## 2025-07-08 LAB
ALBUMIN SERPL BCG-MCNC: 4.1 G/DL (ref 3.5–5.2)
ALT SERPL W P-5'-P-CCNC: 8 U/L (ref 0–50)
CREAT SERPL-MCNC: 1.04 MG/DL (ref 0.51–0.95)
EGFRCR SERPLBLD CKD-EPI 2021: 55 ML/MIN/1.73M2
ERYTHROCYTE [DISTWIDTH] IN BLOOD BY AUTOMATED COUNT: 13.3 % (ref 10–15)
HCT VFR BLD AUTO: 38.8 % (ref 35–47)
HGB BLD-MCNC: 12.6 G/DL (ref 11.7–15.7)
MCH RBC QN AUTO: 32.2 PG (ref 26.5–33)
MCHC RBC AUTO-ENTMCNC: 32.5 G/DL (ref 31.5–36.5)
MCV RBC AUTO: 99 FL (ref 78–100)
PLATELET # BLD AUTO: 272 10E3/UL (ref 150–450)
RBC # BLD AUTO: 3.91 10E6/UL (ref 3.8–5.2)
WBC # BLD AUTO: 5.1 10E3/UL (ref 4–11)

## 2025-07-08 PROCEDURE — 84460 ALANINE AMINO (ALT) (SGPT): CPT

## 2025-07-08 PROCEDURE — 85027 COMPLETE CBC AUTOMATED: CPT

## 2025-07-08 PROCEDURE — 82040 ASSAY OF SERUM ALBUMIN: CPT

## 2025-07-08 PROCEDURE — 36415 COLL VENOUS BLD VENIPUNCTURE: CPT

## 2025-07-08 PROCEDURE — 82565 ASSAY OF CREATININE: CPT
